# Patient Record
Sex: FEMALE | Race: WHITE | NOT HISPANIC OR LATINO | Employment: UNEMPLOYED | URBAN - METROPOLITAN AREA
[De-identification: names, ages, dates, MRNs, and addresses within clinical notes are randomized per-mention and may not be internally consistent; named-entity substitution may affect disease eponyms.]

---

## 2017-01-16 ENCOUNTER — APPOINTMENT (OUTPATIENT)
Dept: RADIOLOGY | Facility: IMAGING CENTER | Age: 52
End: 2017-01-16
Attending: PHYSICIAN ASSISTANT
Payer: COMMERCIAL

## 2017-01-16 ENCOUNTER — OFFICE VISIT (OUTPATIENT)
Dept: URGENT CARE | Facility: CLINIC | Age: 52
End: 2017-01-16
Payer: COMMERCIAL

## 2017-01-16 VITALS
SYSTOLIC BLOOD PRESSURE: 128 MMHG | RESPIRATION RATE: 16 BRPM | HEART RATE: 72 BPM | DIASTOLIC BLOOD PRESSURE: 80 MMHG | OXYGEN SATURATION: 97 % | TEMPERATURE: 97.9 F

## 2017-01-16 DIAGNOSIS — S80.01XA CONTUSION OF KNEE, RIGHT: ICD-10-CM
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
## 2017-01-16 DIAGNOSIS — S76.011A STRAIN OF FLEXOR MUSCLE OF RIGHT HIP, INITIAL ENCOUNTER: Primary | ICD-10-CM

## 2017-01-16 DIAGNOSIS — M25.551 HIP PAIN, RIGHT: ICD-10-CM

## 2017-01-16 PROCEDURE — 99214 OFFICE O/P EST MOD 30 MIN: CPT | Performed by: PHYSICIAN ASSISTANT

## 2017-01-16 PROCEDURE — 73501 X-RAY EXAM HIP UNI 1 VIEW: CPT | Mod: TC,RT | Performed by: PHYSICIAN ASSISTANT

## 2017-01-16 PROCEDURE — 73501 X-RAY EXAM HIP UNI 1 VIEW: CPT | Mod: 26,RT | Performed by: PHYSICIAN ASSISTANT

## 2017-01-16 RX ORDER — CYCLOBENZAPRINE HCL 10 MG
10 TABLET ORAL 3 TIMES DAILY PRN
Qty: 30 TAB | Refills: 1 | Status: SHIPPED | OUTPATIENT
Start: 2017-01-16 | End: 2017-07-13

## 2017-01-16 ASSESSMENT — ENCOUNTER SYMPTOMS
LOSS OF CONSCIOUSNESS: 0
BOWEL INCONTINENCE: 0
TINGLING: 0
NUMBNESS: 0

## 2017-01-16 NOTE — MR AVS SNAPSHOT
Rosy Garay   2017 3:30 PM   Office Visit   MRN: [id number]    Department:  [facility]   Dept Phone:  [phone]    Description:  Female : 1965   Provider:  Alberta Jones PA-C           Reason for Visit     Fall 5 days ago fall R leg pain since fall,  last night with shooting pain      Allergies as of 2017     Allergen Noted Reactions    Nkda [No Known Drug Allergy] 2008         You were diagnosed with     Strain of flexor muscle of right hip, initial encounter   [4193207]  -  Primary     Contusion of knee, right   [523303]       Fall from slipping on ice, initial encounter   [815914]         Vital Signs     Blood Pressure Pulse Temperature Respirations Oxygen Saturation Smoking Status    128/80 mmHg 72 36.6 °C (97.9 °F) 16 97% Former Smoker      Basic Information     Date Of Birth Sex Race Ethnicity Preferred Language    1965 Female White Non- English      Problem List              ICD-10-CM Priority Class Noted - Resolved    Eczema L30.9   2009 - Present    Asthma J45.909   2009 - Present    Hiatal hernia K44.9   2009 - Present    Menopause Z78.0   2009 - Present    Hypothyroid E03.9   2009 - Present    Personal history of gastric bypass Z98.890   2009 - Present    Hypercholesterolemia E78.00   2009 - Present    Vitamin D deficiency E55.9   2012 - Present    Acute asthma exacerbation J45.901   2013 - Present    Obesity, morbid (more than 100 lbs over ideal weight or BMI > 40) (CMS-HCC) E66.01   2013 - Present    Migraine headache with aura G43.109   10/14/2013 - Present    Low back pain M54.5   2013 - Present    Radicular pain M54.10   2013 - Present    Anxiety F41.9   2013 - Present      Health Maintenance        Date Due Completion Dates    IMM PNEUMOCOCCAL 19-64 (ADULT) MEDIUM RISK SERIES (1 of 1 - PPSV23) 1983 ---    IMM DTaP/Tdap/Td Vaccine (1 - Tdap) 3/4/2012 3/3/2012    COLONOSCOPY 5/13/2014 ---    IMM INFLUENZA (1) 9/1/2016 11/8/2013, 11/3/2009    PAP SMEAR 11/8/2016 11/8/2013, 1/27/2012    MAMMOGRAM 10/25/2017 10/25/2016, 11/22/2013, 8/6/2012, 11/16/2011, 2/22/2010, 2/22/2010, 4/11/2008, 4/11/2008, 6/12/2006            Current Immunizations     Influenza TIV (IM) 11/8/2013  5:35 PM    Influenza Vaccine Pediatric 11/3/2009    TD Vaccine 3/3/2012 10:45 AM      Below and/or attached are the medications your provider expects you to take. Review all of your home medications and newly ordered medications with your provider and/or pharmacist. Follow medication instructions as directed by your provider and/or pharmacist. Please keep your medication list with you and share with your provider. Update the information when medications are discontinued, doses are changed, or new medications (including over-the-counter products) are added; and carry medication information at all times in the event of emergency situations     Allergies:  NKDA - (reactions not documented)               Medications  Valid as of: January 16, 2017 -  4:48 PM    Generic Name Brand Name Tablet Size Instructions for use    Albuterol Sulfate (Nebu Soln) PROVENTIL 2.5mg/3ml 3 mL by Nebulization route every 6 hours as needed for Shortness of Breath.        ALPRAZolam (Tab) XANAX 0.5 MG Take 1 Tab by mouth every day.        Amoxicillin-Pot Clavulanate (Tab) AUGMENTIN 875-125 MG Take 1 Tab by mouth 2 times a day.        Aspirin (Chew Tab) ASA 81 MG Take 81 mg by mouth every day.        Betamethasone Dipropionate (Ointment) DIPROLENE 0.05 % Apply 1 Squirt to affected area(s) 2 times a day as needed (rash).        Cyclobenzaprine HCl (Tab) FLEXERIL 5 MG Take 1-2 Tabs by mouth 3 times a day as needed for Mild Pain.        Cyclobenzaprine HCl (Tab) FLEXERIL 10 MG Take 1 Tab by mouth 3 times a day as needed.        Ergocalciferol (Cap) DRISDOL 00834 UNIT Take 1 Cap by mouth every 7 days.        Fluticasone-Salmeterol (AEROSOL  POWDER, BREATH ACTIVATED) ADVAIR DISKUS 500-50 MCG/DOSE USE 1 INHALATION ORALLY    TWICE DAILY        Hydrocodone-Acetaminophen (Tab) NORCO 5-325 MG Take 1-2 Tabs by mouth every 6 hours as needed.        Hydrocodone-Acetaminophen (Tab) NORCO 5-325 MG Take 1 Tab by mouth every 6 hours as needed.        Levothyroxine Sodium (Tab) SYNTHROID 300 MCG Take 1 Tab by mouth every day.        MethylPREDNISolone (Tab) MEDROL DOSPACK 4 MG Take as directed.        Montelukast Sodium   Take  by mouth.        Olopatadine HCl (Solution) PATANOL 0.1 % Place 1 Drop in both eyes 2 times a day.        Omeprazole (CAPSULE DELAYED RELEASE) PRILOSEC 20 MG Take 1 Cap by mouth every day.        Phentermine HCl (Cap) phentermine 30 MG Take 1 Cap by mouth every morning.        PredniSONE (Tab) DELTASONE 20 MG Take 1 Tab by mouth every day. 3 daily for 5 days, then 2 daily for 5 days then 1 daily for 5 days then off.        Sulfamethoxazole-Trimethoprim (Tab) BACTRIM DS,SEPTRA -160 MG Take 1 Tab by mouth every 12 hours. Stay hydrated        Tobramycin (Solution) TOBREX 0.3 % Place 1 Drop in both eyes every 4 hours.        Topiramate (Tab) TOPAMAX 50 MG Take 2 Tabs by mouth every bedtime. Start with one at bedtime for 10 nights and if not better go to 2 at bedtime        .                 Medicines prescribed today were sent to:     [facility] [id number] [place] [address] [address] [place] [address] AT [facility] [address] [place] [address] [facility] [address]    Phone: [phone] Fax: [phone]    Open 24 Hours?: No    [facility] - [place] - [address] AT PORTAL TO REGISTERED [facility] SITES    [address] [facility] [address]    Phone: [phone] Fax: [phone]    Open 24 Hours?: No      Medication refill instructions:       If your prescription bottle indicates you have medication refills left, it is not necessary to call your provider’s office. Please contact your pharmacy  and they will refill your medication.    If your prescription bottle indicates you do not have any refills left, you may request refills at any time through one of the following ways: The online Job1001 system (except Urgent Care), by calling your provider’s office, or by asking your pharmacy to contact your provider’s office with a refill request. Medication refills are processed only during regular business hours and may not be available until the next business day. Your provider may request additional information or to have a follow-up visit with you prior to refilling your medication.   *Please Note: Medication refills are assigned a new Rx number when refilled electronically. Your pharmacy may indicate that no refills were authorized even though a new prescription for the same medication is available at the pharmacy. Please request the medicine by name with the pharmacy before contacting your provider for a refill.        Your To Do List     Future Labs/Procedures Complete By Expires    DX-HIP-UNILATERAL-WITH PELVIS-1 VIEW RIGHT  As directed 1/16/2018      Instructions    Hip Pain  Your hip is the joint between your upper legs and your lower pelvis. The bones, cartilage, tendons, and muscles of your hip joint perform a lot of work each day supporting your body weight and allowing you to move around.  Hip pain can range from a minor ache to severe pain in one or both of your hips. Pain may be felt on the inside of the hip joint near the groin, or the outside near the buttocks and upper thigh. You may have swelling or stiffness as well.   HOME CARE INSTRUCTIONS   · Take medicines only as directed by your health care provider.  · Apply ice to the injured area:  ¨ Put ice in a plastic bag.  ¨ Place a towel between your skin and the bag.  ¨ Leave the ice on for 15-20 minutes at a time, 3-4 times a day.  · Keep your leg raised (elevated) when possible to lessen swelling.  · Avoid activities that cause pain.  · Follow  specific exercises as directed by your health care provider.  · Sleep with a pillow between your legs on your most comfortable side.  · Record how often you have hip pain, the location of the pain, and what it feels like.  SEEK MEDICAL CARE IF:   · You are unable to put weight on your leg.  · Your hip is red or swollen or very tender to touch.  · Your pain or swelling continues or worsens after 1 week.  · You have increasing difficulty walking.  · You have a fever.  SEEK IMMEDIATE MEDICAL CARE IF:   · You have fallen.  · You have a sudden increase in pain and swelling in your hip.  MAKE SURE YOU:   · Understand these instructions.  · Will watch your condition.  · Will get help right away if you are not doing well or get worse.     This information is not intended to replace advice given to you by your health care provider. Make sure you discuss any questions you have with your health care provider.     Document Released: 06/07/2011 Document Revised: 01/08/2016 Document Reviewed: 08/14/2014  W-locate Interactive Patient Education ©2016 [employer] Inc[employer]         Other Notes About Your Plan     Dr Aaron--bariatric surgeon  Lipid profile 8/09:166/94/44/103.           Kobo Access Code: Activation code not generated  Current Kobo Status: Active

## 2017-01-17 NOTE — PROGRESS NOTES
Subjective:      Rosy Garay is a 51 y.o. female who presents with Fall    PMH:  has a past medical history of Eczema (8/17/2009); Breast calcification seen on mammogram; Menopause (8/17/2009); Allergy; Depression; Hiatal hernia (8/17/2009); Anxiety; ASTHMA; and Hypothyroid (8/17/2009). She also has no past medical history of CAD (coronary artery disease), Seizure disorder (CMS-HCC), Liver disease, Psychiatric disorder, Infectious disease, Congestive heart failure (CMS-HCC), Hypertension, Renal disorder, COPD, Diabetes, Stroke (CMS-HCC), or Cancer (CMS-HCC).  MEDS:   Current outpatient prescriptions:   •  ADVAIR DISKUS 500-50 MCG/DOSE AEPB, USE 1 INHALATION ORALLY    TWICE DAILY, Disp: 1 Each, Rfl: 3  •  albuterol (PROVENTIL) 2.5mg/3ml NEBU solution for nebulization, 3 mL by Nebulization route every 6 hours as needed for Shortness of Breath., Disp: 30 mL, Rfl: 3  •  SYNTHROID 300 MCG TABS, Take 1 Tab by mouth every day. (Patient taking differently: Take 175 mcg by mouth every day.), Disp: 90 Each, Rfl: 0  •  aspirin (ASA) 81 MG Chew Tab chewable tablet, Take 81 mg by mouth every day., Disp: , Rfl:   •  sulfamethoxazole-trimethoprim (BACTRIM DS,SEPTRA DS) 800-160 MG TABS oral tablet, Take 1 Tab by mouth every 12 hours. Stay hydrated, Disp: 20 Tab, Rfl: 0  •  hydrocodone-acetaminophen (NORCO) 5-325 MG TABS per tablet, Take 1 Tab by mouth every 6 hours as needed., Disp: 15 Tab, Rfl: 0  •  methylPREDNISolone (MEDROL DOSPACK) 4 MG TABS, Take as directed., Disp: 1 Tab, Rfl: 0  •  amoxicillin-clavulanate (AUGMENTIN) 875-125 MG TABS, Take 1 Tab by mouth 2 times a day., Disp: 20 Tab, Rfl: 0  •  Montelukast Sodium (SINGULAIR PO), Take  by mouth., Disp: , Rfl:   •  tobramycin (TOBREX) 0.3 % SOLN, Place 1 Drop in both eyes every 4 hours., Disp: 5 mL, Rfl: 0  •  olopatadine (PATANOL) 0.1 % ophthalmic solution, Place 1 Drop in both eyes 2 times a day., Disp: 5 mL, Rfl: 3  •  betamethasone dipropionate (DIPROLENE) 0.05  % OINT, Apply 1 Squirt to affected area(s) 2 times a day as needed (rash)., Disp: 60 g, Rfl: 6  •  cyclobenzaprine (FLEXERIL) 5 MG tablet, Take 1-2 Tabs by mouth 3 times a day as needed for Mild Pain., Disp: 30 Tab, Rfl: 0  •  alprazolam (XANAX) 0.5 MG TABS, Take 1 Tab by mouth every day., Disp: 90 Each, Rfl: 1  •  topiramate (TOPAMAX) 50 MG tablet, Take 2 Tabs by mouth every bedtime. Start with one at bedtime for 10 nights and if not better go to 2 at bedtime, Disp: 60 Tab, Rfl: 11  •  hydrocodone-acetaminophen (NORCO) 5-325 MG TABS per tablet, Take 1-2 Tabs by mouth every 6 hours as needed., Disp: 20 Tab, Rfl: 0  •  phentermine 30 MG capsule, Take 1 Cap by mouth every morning., Disp: 30 Each, Rfl: 1  •  predniSONE (DELTASONE) 20 MG TABS, Take 1 Tab by mouth every day. 3 daily for 5 days, then 2 daily for 5 days then 1 daily for 5 days then off., Disp: 30 Each, Rfl: 0  •  ergocalciferol (DRISDOL) 29285 UNIT capsule, Take 1 Cap by mouth every 7 days., Disp: 12 Cap, Rfl: 3  •  omeprazole (PRILOSEC) 20 MG CPDR, Take 1 Cap by mouth every day., Disp: 90 Cap, Rfl: 3  ALLERGIES:   Allergies   Allergen Reactions   • Nkda [No Known Drug Allergy]      SURGHX:   Past Surgical History   Procedure Laterality Date   • Gastric bypass laparoscopic  2004   • Breast biopsy       microcalcification   • Tubal coagulation laparoscopic bilateral  1990   • Cholecystectomy  1993 or 1994     laparoscopic   • Hernia repair  2005     hiatal   • Other abdominal surgery  2007     revision of gastric pouch   • Breast biopsy       left benign breast biopsy in 1994     SOCHX:  reports that she quit smoking about 11 years ago. Her smoking use included Cigarettes. She quit after 10 years of use. She has never used smokeless tobacco. She reports that she does not drink alcohol or use illicit drugs.  FH: family history includes Cancer in her maternal grandfather and maternal grandmother; Heart Disease in her father, maternal grandmother, mother, and  paternal grandmother; Hyperlipidemia in her father and mother; Lung Disease in her father.          HPI Comments: Patient presents with:  Fall: 5 days ago fall R leg pain since fall,  last night with shooting pain. Pain is worse with flexion/extension going up and down stairs.  Pt states little pain when walking on flat ground, lying down or sitting.          Fall  The accident occurred 3 to 5 days ago. The fall occurred while walking. Distance fallen: ground level fall. She landed on concrete. There was no blood loss. The point of impact was the right knee. The pain is present in the right hip and right upper leg. The pain is at a severity of 4/10. The pain is mild. The symptoms are aggravated by flexion, extension, ambulation and heat. Pertinent negatives include no bowel incontinence, loss of consciousness, numbness or tingling. She has tried acetaminophen, NSAID and rest for the symptoms. The treatment provided mild relief.       Review of Systems   Gastrointestinal: Negative for bowel incontinence.   Neurological: Negative for tingling, loss of consciousness and numbness.   All other systems reviewed and are negative.         Objective:     /80 mmHg  Pulse 72  Temp(Src) 36.6 °C (97.9 °F)  Resp 16  SpO2 97%     Physical Exam   Constitutional: She is oriented to person, place, and time. She appears well-developed and well-nourished. No distress.   HENT:   Head: Normocephalic and atraumatic.   Eyes: Conjunctivae and EOM are normal. Pupils are equal, round, and reactive to light.   Neck: Normal range of motion. Neck supple.   Cardiovascular: Normal rate and intact distal pulses.    Pulmonary/Chest: Effort normal and breath sounds normal.   Musculoskeletal:        Right hip: She exhibits decreased strength (pain with resisted flexion causes loss of strength compared to left hip ). She exhibits normal range of motion (painful but full), no tenderness, no bony tenderness, no swelling and no deformity.         Legs:  Right knee is non tender on exam (this was point of impact of fall).    Neurological: She is alert and oriented to person, place, and time.   Skin: Skin is warm and dry.   Psychiatric: She has a normal mood and affect.   Nursing note and vitals reviewed.         Xray images viewed and read by me, confirmed by radiology:      FINDINGS:  No acute right hip fracture or dislocation identified.  Both hip joint spaces preserved.  No pelvic fracture identified.  SI joints are mildly sclerotic.  Assessment/Plan:     1. Strain of flexor muscle of right hip, initial encounter  DX-HIP-UNILATERAL-WITH PELVIS-1 VIEW RIGHT    cyclobenzaprine (FLEXERIL) 10 MG Tab   2. Contusion of knee, right  DX-HIP-UNILATERAL-WITH PELVIS-1 VIEW RIGHT    cyclobenzaprine (FLEXERIL) 10 MG Tab   3. Fall from slipping on ice, initial encounter  DX-HIP-UNILATERAL-WITH PELVIS-1 VIEW RIGHT    cyclobenzaprine (FLEXERIL) 10 MG Tab     OTC ibuprofen for pain and inflammation.   Rx flexeril while at home, do not drive while taking this medication.     PT should follow up with PCP in 3-5 days for re-evaluation if symptoms have not improved.    Discussed red flags and reasons to return to UC or ED.  Pt and/or family verbalized understanding of diagnosis and follow up instructions and was given informational handout on diagnosis.  PT discharged.

## 2017-02-23 ENCOUNTER — HOSPITAL ENCOUNTER (OUTPATIENT)
Dept: LAB | Facility: MEDICAL CENTER | Age: 52
End: 2017-02-23
Attending: PHYSICIAN ASSISTANT
Payer: COMMERCIAL

## 2017-02-23 LAB
T4 FREE SERPL-MCNC: 0.99 NG/DL (ref 0.53–1.43)
TSH SERPL DL<=0.005 MIU/L-ACNC: 8.49 UIU/ML (ref 0.3–3.7)

## 2017-02-23 PROCEDURE — 86800 THYROGLOBULIN ANTIBODY: CPT

## 2017-02-23 PROCEDURE — 84443 ASSAY THYROID STIM HORMONE: CPT

## 2017-02-23 PROCEDURE — 84439 ASSAY OF FREE THYROXINE: CPT

## 2017-02-23 PROCEDURE — 36415 COLL VENOUS BLD VENIPUNCTURE: CPT

## 2017-02-25 LAB — THYROGLOB AB SERPL-ACNC: 128 IU/ML (ref 0–4)

## 2017-07-13 ENCOUNTER — OFFICE VISIT (OUTPATIENT)
Dept: ENDOCRINOLOGY | Facility: MEDICAL CENTER | Age: 52
End: 2017-07-13
Payer: COMMERCIAL

## 2017-07-13 VITALS
OXYGEN SATURATION: 99 % | HEIGHT: 66 IN | WEIGHT: 232 LBS | BODY MASS INDEX: 37.28 KG/M2 | HEART RATE: 77 BPM | SYSTOLIC BLOOD PRESSURE: 102 MMHG | DIASTOLIC BLOOD PRESSURE: 70 MMHG

## 2017-07-13 DIAGNOSIS — Z78.0 MENOPAUSE: ICD-10-CM

## 2017-07-13 DIAGNOSIS — E03.8 HYPOTHYROIDISM DUE TO HASHIMOTO'S THYROIDITIS: ICD-10-CM

## 2017-07-13 DIAGNOSIS — E66.01 MORBID OBESITY, UNSPECIFIED OBESITY TYPE (HCC): ICD-10-CM

## 2017-07-13 DIAGNOSIS — E06.3 HYPOTHYROIDISM DUE TO HASHIMOTO'S THYROIDITIS: ICD-10-CM

## 2017-07-13 PROCEDURE — 99205 OFFICE O/P NEW HI 60 MIN: CPT | Performed by: INTERNAL MEDICINE

## 2017-07-13 RX ORDER — LEVOTHYROXINE SODIUM 175 UG/1
175 TABLET ORAL
COMMUNITY
End: 2017-07-13

## 2017-07-13 RX ORDER — ESTRADIOL 1 MG/1
1 TABLET ORAL DAILY
Qty: 30 TAB | Refills: 3 | Status: SHIPPED | OUTPATIENT
Start: 2017-07-13 | End: 2017-12-14 | Stop reason: SDUPTHER

## 2017-07-13 RX ORDER — LIOTHYRONINE SODIUM 25 UG/1
25 TABLET ORAL DAILY
Qty: 30 TAB | Refills: 3 | Status: SHIPPED | OUTPATIENT
Start: 2017-07-13 | End: 2017-12-14 | Stop reason: SDUPTHER

## 2017-07-13 RX ORDER — LEVOTHYROXINE SODIUM 0.2 MG/1
200 TABLET ORAL
Qty: 30 TAB | Refills: 3 | Status: SHIPPED | OUTPATIENT
Start: 2017-07-13 | End: 2017-12-14 | Stop reason: SDUPTHER

## 2017-07-13 RX ORDER — EAR PLUGS
8 EACH OTIC (EAR) DAILY
COMMUNITY
End: 2018-01-28

## 2017-07-13 NOTE — MR AVS SNAPSHOT
"        Rosy Garay   2017 7:30 AM   Office Visit   MRN: [id number]    Department:  [facility]inology [facility]   Dept Phone:  [phone]    Description:  Female : 1965   Provider:  Héctor Wang M.D.           Reason for Visit     Hypothyroidism Dx. in .    Fatigue           Allergies as of 2017     Allergen Noted Reactions    Nkda [No Known Drug Allergy] 2008         You were diagnosed with     Hypothyroidism due to Hashimoto's thyroiditis   [7183405]       Menopause   [791151]         Vital Signs     Blood Pressure Pulse Height Weight Body Mass Index Oxygen Saturation    102/70 mmHg 77 1.676 m (5' 6\") 105.235 kg (232 lb) 37.46 kg/m2 99%    Smoking Status                   Former Smoker           Basic Information     Date Of Birth Sex Race Ethnicity Preferred Language    1965 Female White Non- English      Your appointments     Oct 02, 2017  7:40 AM   Established Patient with Héctor Wang M.D.   [facility]    [address] [place] [address], [address]  [facility] [id number] [phone]           You will be receiving a confirmation call a few days before your appointment from our automated call confirmation system.              Problem List              ICD-10-CM Priority Class Noted - Resolved    Eczema L30.9   2009 - Present    Asthma J45.909   2009 - Present    Hiatal hernia K44.9   2009 - Present    Menopause Z78.0   2009 - Present    Hypothyroid E03.9   2009 - Present    Personal history of gastric bypass Z98.890   2009 - Present    Hypercholesterolemia E78.00   2009 - Present    Vitamin D deficiency E55.9   2012 - Present    Acute asthma exacerbation J45.901   2013 - Present    Obesity, morbid (more than 100 lbs over ideal weight or BMI > 40) (CMS-HCC) E66.01   2013 - Present    Migraine headache with aura G43.109   10/14/2013 - Present    Low back pain M54.5   2013 - " Present    Radicular pain M54.10   11/22/2013 - Present    Anxiety F41.9   11/22/2013 - Present      Health Maintenance        Date Due Completion Dates    IMM PNEUMOCOCCAL 19-64 (ADULT) MEDIUM RISK SERIES (1 of 1 - PPSV23) 5/13/1983 ---    IMM DTaP/Tdap/Td Vaccine (1 - Tdap) 3/4/2012 3/3/2012    COLONOSCOPY 5/13/2014 ---    PAP SMEAR 11/8/2016 11/8/2013, 1/27/2012    IMM INFLUENZA (1) 9/1/2017 11/8/2013, 11/3/2009    MAMMOGRAM 10/25/2017 10/25/2016, 11/22/2013, 8/6/2012, 11/16/2011, 2/22/2010, 2/22/2010, 4/11/2008, 4/11/2008, 6/12/2006            Current Immunizations     Influenza TIV (IM) 11/8/2013  5:35 PM    Influenza Vaccine Pediatric 11/3/2009    TD Vaccine 3/3/2012 10:45 AM      Below and/or attached are the medications your provider expects you to take. Review all of your home medications and newly ordered medications with your provider and/or pharmacist. Follow medication instructions as directed by your provider and/or pharmacist. Please keep your medication list with you and share with your provider. Update the information when medications are discontinued, doses are changed, or new medications (including over-the-counter products) are added; and carry medication information at all times in the event of emergency situations     Allergies:  NKDA - (reactions not documented)               Medications  Valid as of: July 13, 2017 -  8:37 AM    Generic Name Brand Name Tablet Size Instructions for use    Albuterol Sulfate (Nebu Soln) PROVENTIL 2.5mg/3ml 3 mL by Nebulization route every 6 hours as needed for Shortness of Breath.        Aspirin (Chew Tab) ASA 81 MG Take 81 mg by mouth every day.        Betamethasone Dipropionate (Ointment) DIPROLENE 0.05 % Apply 1 Squirt to affected area(s) 2 times a day as needed (rash).        Calcium-Vitamin D-Vitamin K (Chew Tab) Calcium-Vitamin D-Vitamin K 500-1000-40 MG-UNT-MCG Take  by mouth.        Cyanocobalamin (Liquid) Vitamin B-12 1000 MCG/15ML Take  by mouth.         Estradiol (Tab) ESTRACE 1 MG Take 1 Tab by mouth every day for 30 days.        Fluticasone-Salmeterol (AEROSOL POWDER, BREATH ACTIVATED) ADVAIR DISKUS 500-50 MCG/DOSE USE 1 INHALATION ORALLY    TWICE DAILY        Levothyroxine Sodium (Tab) SYNTHROID 200 MCG Take 1 Tab by mouth Every morning on an empty stomach.        Liothyronine Sodium (Tab) CYTOMEL 25 MCG Take 1 Tab by mouth every day for 30 days.        non-formulary med non-formulary med  HCG 1 ml oral TID        Omeprazole (CAPSULE DELAYED RELEASE) PRILOSEC 20 MG Take 1 Cap by mouth every day.        Progesterone Micronized (Cap) PROMETRIUM 100 MG Take 1 Cap by mouth every day.        .                 Medicines prescribed today were sent to:     [facility] [address] [place] [address] - [address] [facility] [address] [place] [address] [address]    [address] [facility] [address] [place] [address]    Phone: [phone] Fax: [phone]    Open 24 Hours?: No    [facility] - [place], [place] - [address] AT PORTAL TO REGISTERED [facility] SITES    [address] [facility] [address]    Phone: [phone] Fax: [phone]    Open 24 Hours?: No      Medication refill instructions:       If your prescription bottle indicates you have medication refills left, it is not necessary to call your provider’s office. Please contact your pharmacy and they will refill your medication.    If your prescription bottle indicates you do not have any refills left, you may request refills at any time through one of the following ways: The online Hunch system (except Urgent Care), by calling your provider’s office, or by asking your pharmacy to contact your provider’s office with a refill request. Medication refills are processed only during regular business hours and may not be available until the next business day. Your provider may request additional information or to have a follow-up visit with you prior to refilling your medication.   *Please Note:  Medication refills are assigned a new Rx number when refilled electronically. Your pharmacy may indicate that no refills were authorized even though a new prescription for the same medication is available at the pharmacy. Please request the medicine by name with the pharmacy before contacting your provider for a refill.        Your To Do List     Future Labs/Procedures Complete By Expires    FREE THYROXINE  8/24/2017 7/13/2018    Scheduling Instructions:    To be done after 6 weeks.    TRIIDOTHYRONINE  8/24/2017 7/13/2018    Scheduling Instructions:    To be done after 6 weeks.    Comments:    Free T3    TRIIDOTHYRONINE  8/24/2017 7/13/2018    Scheduling Instructions:    To be done after 6 weeks.    Comments:    Total T3    TSH  8/24/2017 7/13/2018    Scheduling Instructions:    To be done after 6 weeks.      Other Notes About Your Plan     Dr Aaron--bariatric surgeon  Lipid profile 8/09:166/94/44/103.           atVenu Access Code: Activation code not generated  Current atVenu Status: Active

## 2017-07-13 NOTE — PROGRESS NOTES
Rosy states that she is having fatigue, dry skin and weight gain.  She had a Gastric Bypass in 2004 and lost around 70 pounds.  She then started to gain the weight back and was diagnosed with hypothyroidism.  Her latest lab work on 6/5/17 was TSH .11, T3 .89, T4 1.54.  She was on Levothyroixe 250 mcg and was decreased to 175 mcg.  She was started oral HCG 1 ml TID and lost 20 pounds.

## 2017-07-13 NOTE — PROGRESS NOTES
New Patient Consult Note  Primary care physician: No primary care provider on file.    Reason for consult: Hypothyroidism/morbid obesity/menopause    HPI:  Rosy Garay is a 52 y.o. old patient who comes in today for evaluation of hypothyroidism. She is currently on 175 µg of levothyroxine. Her dose was reduced from 250 µg to 175 µg after the TSH results in June 2017 which was 0.11.  She complains of feeling more fatigued and tired throughout the day. He also complains of dry skin.    Obesity: She has a history of gastric bypass at the [facility] following which she lost close to  pounds. States that she then gradually started to gain the weight back and is trying to lose more weight. She is currently on hCG drops over-the-counter one mL 3 times daily and since initiating that she has lost 20 pounds.    ROS:  Constitutional: Fatigue, No weight loss  Cardiac: No palpitations or racing heart  Resp: No shortness of breath  Neuro: No numbness or tinging in feet  Endo: No heat or cold intolerance, no polyuria or polydipsia  Integumentary: Dry skin  All other systems were reviewed and were negative.    Past Medical History:  Patient Active Problem List    Diagnosis Date Noted   • Low back pain 11/22/2013   • Radicular pain 11/22/2013   • Anxiety 11/22/2013   • Migraine headache with aura 10/14/2013   • Acute asthma exacerbation 05/13/2013   • Obesity, morbid (more than 100 lbs over ideal weight or BMI > 40) (CMS-[facility]) 05/13/2013   • Vitamin D deficiency 07/17/2012   • Eczema 08/17/2009   • Asthma 08/17/2009   • Hiatal hernia 08/17/2009   • Menopause 08/17/2009   • Hypothyroid 08/17/2009   • Personal history of gastric bypass 08/17/2009   • Hypercholesterolemia 08/17/2009       Past Surgical History:  Past Surgical History   Procedure Laterality Date   • Gastric bypass laparoscopic  2004   • Breast biopsy       microcalcification   • Tubal coagulation laparoscopic bilateral  1990   • Cholecystectomy  1993 or  1994     laparoscopic   • Hernia repair  2005     hiatal   • Other abdominal surgery  2007     revision of gastric pouch   • Breast biopsy       left benign breast biopsy in 1994       Allergies:  Nkda    Social History:  Social History     Social History   • Marital Status:      Spouse Name: N/A   • Number of Children: N/A   • Years of Education: N/A     Occupational History   • Not on file.     Social History Main Topics   • Smoking status: Former Smoker -- 10 years     Types: Cigarettes     Quit date: 01/02/2006   • Smokeless tobacco: Never Used      Comment: 5 cigarettes a day   • Alcohol Use: No   • Drug Use: No   • Sexual Activity:     Partners: Male     Birth Control/ Protection: Post-Menopausal      Comment: ,      Other Topics Concern   • Exercise Yes   • Bike Helmet No   • Seat Belt Yes   • Self-Exams Yes     Social History Narrative       Family History:  Family History   Problem Relation Age of Onset   • Heart Disease Mother      MI   • Hyperlipidemia Mother    • Heart Disease Father      MI x3, widowmaker   • Lung Disease Father      COPD   • Hyperlipidemia Father    • Heart Disease Maternal Grandmother      MI double bypass   • Cancer Maternal Grandmother      breast   • Cancer Maternal Grandfather      colon and bile duct-diagnosed in 60's   • Heart Disease Paternal Grandmother      MI       Medications:    Current outpatient prescriptions:   •  Cyanocobalamin (VITAMIN B-12) 1000 MCG/15ML Liquid, Take  by mouth., Disp: , Rfl:   •  Calcium-Vitamin D-Vitamin K (CALCIUM + D) 500-1000-40 MG-UNT-MCG Chew Tab, Take  by mouth., Disp: , Rfl:   •  non-formulary med, HCG 1 ml oral TID, Disp: , Rfl:   •  estradiol (ESTRACE) 1 MG Tab, Take 1 Tab by mouth every day for 30 days., Disp: 30 Tab, Rfl: 3  •  progesterone (PROMETRIUM) 100 MG Cap, Take 1 Cap by mouth every day., Disp: 30 Cap, Rfl: 3  •  levothyroxine (SYNTHROID) 200 MCG Tab, Take 1 Tab by mouth Every morning on an empty  "stomach., Disp: 30 Tab, Rfl: 3  •  liothyronine (CYTOMEL) 25 MCG Tab, Take 1 Tab by mouth every day for 30 days., Disp: 30 Tab, Rfl: 3  •  aspirin (ASA) 81 MG Chew Tab chewable tablet, Take 81 mg by mouth every day., Disp: , Rfl:   •  ADVAIR DISKUS 500-50 MCG/DOSE AEPB, USE 1 INHALATION ORALLY    TWICE DAILY, Disp: 1 Each, Rfl: 3  •  betamethasone dipropionate (DIPROLENE) 0.05 % OINT, Apply 1 Squirt to affected area(s) 2 times a day as needed (rash)., Disp: 60 g, Rfl: 6  •  omeprazole (PRILOSEC) 20 MG CPDR, Take 1 Cap by mouth every day., Disp: 90 Cap, Rfl: 3  •  albuterol (PROVENTIL) 2.5mg/3ml NEBU solution for nebulization, 3 mL by Nebulization route every 6 hours as needed for Shortness of Breath., Disp: 30 mL, Rfl: 3    Labs:    Physical Examination:  Vital signs: /70 mmHg  Pulse 77  Ht 1.676 m (5' 6\")  Wt 105.235 kg (232 lb)  BMI 37.46 kg/m2  SpO2 99% Body mass index is 37.46 kg/(m^2).  General: No apparent distress, cooperative, obese  Eyes: No scleral icterus or discharge  ENMT: Normal on external inspection of nose, lips, normal thyroid exam  Neck: No abnormal masses on inspection  Resp: Normal effort, clear to auscultation bilaterally   CVS: Regular rate and rhythm, S1 S2 normal, no murmur   Extremities: No edema  Abdomen: abdominal obesity present  Neuro: Alert and oriented  Skin: No rash  Psych: Normal mood and affect, intact memory and able to make informed decisions    Assessment and Plan:    1. Hypothyroidism due to Hashimoto's thyroiditis  Start   - levothyroxine (SYNTHROID) 200 MCG Tab; Take 1 Tab by mouth Every morning on an empty stomach along with - liothyronine (CYTOMEL) 25 MCG daily.     2. Menopause  Start   - estradiol (ESTRACE) 1 MG Tab; Take 1 Tab by mouth every day along with progesterone (PROMETRIUM) 100 MG  1 Cap every day.      3. Morbid obesity, unspecified obesity type (CMS-HCC)  Seems that the hCG drops are helping her lose weight. To continue and we will monitor " closely.      Return in about 2 months (around 9/13/2017).    Total face to face time spent with patient equals 60 minutes. 35/60 minutes were spent on counseling the patient about the pathophysiology of pituitary-thyroid axis,  pituitary-gonadal axis, symptoms of menopause,  side effects and benefits of thyroid hormone, estrogen and progesterone, different anti-obesity meds and their mechanism of action.    Thank you for allowing me to participate in the care of this patient.    Héctor Wang M.D.  07/13/2017    CC:   No primary care provider on file.    This note was created using voice recognition software (Dragon). The accuracy of the dictation is limited by the abilities of the software. I have reviewed the note prior to signing, however some errors in grammar and context are still possible. If you have any questions related to this note please do not hesitate to contact our office.

## 2017-08-09 ENCOUNTER — HOSPITAL ENCOUNTER (EMERGENCY)
Facility: MEDICAL CENTER | Age: 52
End: 2017-08-09
Attending: EMERGENCY MEDICINE
Payer: COMMERCIAL

## 2017-08-09 ENCOUNTER — APPOINTMENT (OUTPATIENT)
Dept: RADIOLOGY | Facility: MEDICAL CENTER | Age: 52
End: 2017-08-09
Attending: EMERGENCY MEDICINE
Payer: COMMERCIAL

## 2017-08-09 VITALS
HEIGHT: 66 IN | OXYGEN SATURATION: 99 % | DIASTOLIC BLOOD PRESSURE: 69 MMHG | BODY MASS INDEX: 37.95 KG/M2 | HEART RATE: 95 BPM | TEMPERATURE: 98.6 F | WEIGHT: 236.11 LBS | RESPIRATION RATE: 16 BRPM | SYSTOLIC BLOOD PRESSURE: 110 MMHG

## 2017-08-09 DIAGNOSIS — E66.9 OBESITY, UNSPECIFIED OBESITY SEVERITY, UNSPECIFIED OBESITY TYPE: ICD-10-CM

## 2017-08-09 DIAGNOSIS — R11.2 NON-INTRACTABLE VOMITING WITH NAUSEA, UNSPECIFIED VOMITING TYPE: ICD-10-CM

## 2017-08-09 DIAGNOSIS — Z98.84 HISTORY OF GASTRIC BYPASS: ICD-10-CM

## 2017-08-09 DIAGNOSIS — R10.13 EPIGASTRIC PAIN: ICD-10-CM

## 2017-08-09 LAB
ALBUMIN SERPL BCP-MCNC: 4.3 G/DL (ref 3.2–4.9)
ALBUMIN/GLOB SERPL: 1.4 G/DL
ALP SERPL-CCNC: 65 U/L (ref 30–99)
ALT SERPL-CCNC: 30 U/L (ref 2–50)
ANION GAP SERPL CALC-SCNC: 11 MMOL/L (ref 0–11.9)
AST SERPL-CCNC: 29 U/L (ref 12–45)
BASOPHILS # BLD AUTO: 1 % (ref 0–1.8)
BASOPHILS # BLD: 0.08 K/UL (ref 0–0.12)
BILIRUB SERPL-MCNC: 1 MG/DL (ref 0.1–1.5)
BUN SERPL-MCNC: 17 MG/DL (ref 8–22)
CALCIUM SERPL-MCNC: 9.3 MG/DL (ref 8.4–10.2)
CHLORIDE SERPL-SCNC: 105 MMOL/L (ref 96–112)
CO2 SERPL-SCNC: 21 MMOL/L (ref 20–33)
CREAT SERPL-MCNC: 0.66 MG/DL (ref 0.5–1.4)
EKG IMPRESSION: NORMAL
EOSINOPHIL # BLD AUTO: 0.45 K/UL (ref 0–0.51)
EOSINOPHIL NFR BLD: 5.8 % (ref 0–6.9)
ERYTHROCYTE [DISTWIDTH] IN BLOOD BY AUTOMATED COUNT: 37.2 FL (ref 35.9–50)
GFR SERPL CREATININE-BSD FRML MDRD: >60 ML/MIN/1.73 M 2
GLOBULIN SER CALC-MCNC: 3.1 G/DL (ref 1.9–3.5)
GLUCOSE SERPL-MCNC: 88 MG/DL (ref 65–99)
HCT VFR BLD AUTO: 44 % (ref 37–47)
HGB BLD-MCNC: 15.1 G/DL (ref 12–16)
IMM GRANULOCYTES # BLD AUTO: 0.02 K/UL (ref 0–0.11)
IMM GRANULOCYTES NFR BLD AUTO: 0.3 % (ref 0–0.9)
LACTATE BLD-SCNC: 1.29 MMOL/L (ref 0.5–2)
LIPASE SERPL-CCNC: 20 U/L (ref 7–58)
LYMPHOCYTES # BLD AUTO: 2.12 K/UL (ref 1–4.8)
LYMPHOCYTES NFR BLD: 27.2 % (ref 22–41)
MCH RBC QN AUTO: 29.2 PG (ref 27–33)
MCHC RBC AUTO-ENTMCNC: 34.3 G/DL (ref 33.6–35)
MCV RBC AUTO: 84.9 FL (ref 81.4–97.8)
MONOCYTES # BLD AUTO: 0.65 K/UL (ref 0–0.85)
MONOCYTES NFR BLD AUTO: 8.3 % (ref 0–13.4)
NEUTROPHILS # BLD AUTO: 4.47 K/UL (ref 2–7.15)
NEUTROPHILS NFR BLD: 57.4 % (ref 44–72)
NRBC # BLD AUTO: 0 K/UL
NRBC BLD AUTO-RTO: 0 /100 WBC
PLATELET # BLD AUTO: 256 K/UL (ref 164–446)
PMV BLD AUTO: 10.5 FL (ref 9–12.9)
POTASSIUM SERPL-SCNC: 3.8 MMOL/L (ref 3.6–5.5)
PROT SERPL-MCNC: 7.4 G/DL (ref 6–8.2)
RBC # BLD AUTO: 5.18 M/UL (ref 4.2–5.4)
SODIUM SERPL-SCNC: 137 MMOL/L (ref 135–145)
SPECIMEN DRAWN FROM PATIENT: NORMAL
TROPONIN I SERPL-MCNC: <0.02 NG/ML (ref 0–0.04)
WBC # BLD AUTO: 7.8 K/UL (ref 4.8–10.8)

## 2017-08-09 PROCEDURE — 84484 ASSAY OF TROPONIN QUANT: CPT

## 2017-08-09 PROCEDURE — 83605 ASSAY OF LACTIC ACID: CPT

## 2017-08-09 PROCEDURE — A9270 NON-COVERED ITEM OR SERVICE: HCPCS | Performed by: EMERGENCY MEDICINE

## 2017-08-09 PROCEDURE — 96376 TX/PRO/DX INJ SAME DRUG ADON: CPT

## 2017-08-09 PROCEDURE — 74177 CT ABD & PELVIS W/CONTRAST: CPT

## 2017-08-09 PROCEDURE — 96375 TX/PRO/DX INJ NEW DRUG ADDON: CPT

## 2017-08-09 PROCEDURE — 94760 N-INVAS EAR/PLS OXIMETRY 1: CPT

## 2017-08-09 PROCEDURE — 80053 COMPREHEN METABOLIC PANEL: CPT

## 2017-08-09 PROCEDURE — 96361 HYDRATE IV INFUSION ADD-ON: CPT

## 2017-08-09 PROCEDURE — 85025 COMPLETE CBC W/AUTO DIFF WBC: CPT

## 2017-08-09 PROCEDURE — 700111 HCHG RX REV CODE 636 W/ 250 OVERRIDE (IP): Performed by: EMERGENCY MEDICINE

## 2017-08-09 PROCEDURE — 700117 HCHG RX CONTRAST REV CODE 255: Performed by: EMERGENCY MEDICINE

## 2017-08-09 PROCEDURE — 83690 ASSAY OF LIPASE: CPT

## 2017-08-09 PROCEDURE — 700102 HCHG RX REV CODE 250 W/ 637 OVERRIDE(OP): Performed by: EMERGENCY MEDICINE

## 2017-08-09 PROCEDURE — 96374 THER/PROPH/DIAG INJ IV PUSH: CPT

## 2017-08-09 PROCEDURE — 93005 ELECTROCARDIOGRAM TRACING: CPT | Performed by: EMERGENCY MEDICINE

## 2017-08-09 PROCEDURE — 700105 HCHG RX REV CODE 258: Performed by: EMERGENCY MEDICINE

## 2017-08-09 PROCEDURE — 99285 EMERGENCY DEPT VISIT HI MDM: CPT

## 2017-08-09 RX ORDER — IBUPROFEN 200 MG
400 TABLET ORAL EVERY 6 HOURS PRN
Status: SHIPPED | COMMUNITY
End: 2017-09-08

## 2017-08-09 RX ORDER — SODIUM CHLORIDE 9 MG/ML
1000 INJECTION, SOLUTION INTRAVENOUS ONCE
Status: COMPLETED | OUTPATIENT
Start: 2017-08-09 | End: 2017-08-09

## 2017-08-09 RX ORDER — MORPHINE SULFATE 10 MG/ML
6 INJECTION, SOLUTION INTRAMUSCULAR; INTRAVENOUS ONCE
Status: DISCONTINUED | OUTPATIENT
Start: 2017-08-09 | End: 2017-08-09 | Stop reason: HOSPADM

## 2017-08-09 RX ORDER — ACETAMINOPHEN 500 MG
1000 TABLET ORAL EVERY 6 HOURS PRN
COMMUNITY
End: 2018-01-28

## 2017-08-09 RX ORDER — ONDANSETRON 2 MG/ML
4 INJECTION INTRAMUSCULAR; INTRAVENOUS ONCE
Status: COMPLETED | OUTPATIENT
Start: 2017-08-09 | End: 2017-08-09

## 2017-08-09 RX ORDER — MORPHINE SULFATE 4 MG/ML
4 INJECTION, SOLUTION INTRAMUSCULAR; INTRAVENOUS
Status: DISPENSED | OUTPATIENT
Start: 2017-08-09 | End: 2017-08-09

## 2017-08-09 RX ORDER — M-VIT,TX,IRON,MINS/CALC/FOLIC 27MG-0.4MG
1 TABLET ORAL DAILY
COMMUNITY
End: 2018-01-28

## 2017-08-09 RX ORDER — ONDANSETRON 4 MG/1
4 TABLET, ORALLY DISINTEGRATING ORAL EVERY 8 HOURS PRN
Qty: 10 TAB | Refills: 0 | Status: SHIPPED | OUTPATIENT
Start: 2017-08-09 | End: 2018-01-28

## 2017-08-09 RX ADMIN — ONDANSETRON 4 MG: 2 INJECTION INTRAMUSCULAR; INTRAVENOUS at 14:55

## 2017-08-09 RX ADMIN — MORPHINE SULFATE 4 MG: 4 INJECTION INTRAVENOUS at 14:54

## 2017-08-09 RX ADMIN — SODIUM CHLORIDE 1000 ML: 9 INJECTION, SOLUTION INTRAVENOUS at 14:55

## 2017-08-09 RX ADMIN — IOHEXOL 100 ML: 350 INJECTION, SOLUTION INTRAVENOUS at 15:12

## 2017-08-09 RX ADMIN — LIDOCAINE HYDROCHLORIDE 30 ML: 20 SOLUTION OROPHARYNGEAL at 16:15

## 2017-08-09 RX ADMIN — MORPHINE SULFATE 4 MG: 4 INJECTION INTRAVENOUS at 15:37

## 2017-08-09 RX ADMIN — SODIUM CHLORIDE 1000 ML: 900 INJECTION, SOLUTION INTRAVENOUS at 18:00

## 2017-08-09 ASSESSMENT — PAIN SCALES - GENERAL
PAINLEVEL_OUTOF10: 3
PAINLEVEL_OUTOF10: 6
PAINLEVEL_OUTOF10: 6

## 2017-08-09 NOTE — ED PROVIDER NOTES
ED Provider Note    CHIEF COMPLAINT  Chief Complaint   Patient presents with   • Abdominal Pain       HPI  Rosy Garay is a 52 y.o. female who presents with 2 days of constant abdominal pain and one episode of vomiting last night in the setting of prior Baljit-en-Y gastric bypass and hiatal hernia repair.    Location: Predominantly in the epigastrium    Quality: Occasionally sharp and occasionally dull pain, sometimes crampy with occasional radiation to both upper quadrants and towards her back    Severity: At worse symptoms are severe, present they're moderate    Duration: Constant since onset    Timing: Onset 2 days ago without obvious trigger    Context: Not related to by mouth intake, however she has had minimal to take by mouth today    Modifying factors: Elevated [place] eating or drinking, or palpation; denies alleviating factors    Associated signs/symptoms: Positives include nausea and vomiting, pertinent negatives include no fever, cough, chest pain, syncope, dizziness, palpitations, recent illness, weakness, paresthesias, urinary symptoms, diarrhea      REVIEW OF SYSTEMS  See HPI for further details. All other systems are negative.     PAST MEDICAL HISTORY   has a past medical history of Eczema (8/17/2009); Breast calcification seen on mammogram; Menopause (8/17/2009); Allergy; Depression; Hiatal hernia (8/17/2009); Anxiety; ASTHMA; and Hypothyroid (8/17/2009).    PAST FAMILY HISTORY  Family History   Problem Relation Age of Onset   • Heart Disease Mother      MI   • Hyperlipidemia Mother    • Heart Disease Father      MI x3, widowmaker   • Lung Disease Father      COPD   • Hyperlipidemia Father    • Heart Disease Maternal Grandmother      MI double bypass   • Cancer Maternal Grandmother      breast   • Cancer Maternal Grandfather      colon and bile duct-diagnosed in 60's   • Heart Disease Paternal Grandmother      MI       SOCIAL HISTORY  Social History     Social History Main Topics   • Smoking  "status: Former Smoker -- 10 years     Types: Cigarettes     Quit date: 01/02/2006   • Smokeless tobacco: Never Used      Comment: 5 cigarettes a day   • Alcohol Use: No   • Drug Use: No   • Sexual Activity:     Partners: Male     Birth Control/ Protection: Post-Menopausal      Comment: ,        SURGICAL HISTORY   has past surgical history that includes gastric bypass laparoscopic (2004); breast biopsy; tubal coagulation laparoscopic bilateral (1990); cholecystectomy (1993 or 1994); hernia repair (2005); other abdominal surgery (2007); and breast biopsy.    CURRENT MEDICATIONS  Home Medications     Reviewed by Katie Rodriguez (Pharmacy Tech) on 08/09/17 at 1321  Med List Status: Complete    Medication Last Dose Status    acetaminophen (TYLENOL) 500 MG Tab 8/8/2017 Active    ADVAIR DISKUS 500-50 MCG/DOSE AEPB 8/9/2017 Active    aspirin (ASA) 81 MG Chew Tab chewable tablet 8/9/2017 Active    betamethasone dipropionate (DIPROLENE) 0.05 % OINT > 3 days Active    Cyanocobalamin (VITAMIN B-12) 1000 MCG/15ML Liquid 8/9/2017 Active    estradiol (ESTRACE) 1 MG Tab 8/9/2017 Active    ibuprofen (MOTRIN) 200 MG Tab 8/8/2017 Active    levothyroxine (SYNTHROID) 200 MCG Tab 8/9/2017 Active    liothyronine (CYTOMEL) 25 MCG Tab 8/9/2017 Active    non-formulary med 8/9/2017 Active    omeprazole (PRILOSEC) 20 MG CPDR 8/9/2017 Active    progesterone (PROMETRIUM) 100 MG Cap 8/9/2017 Active    therapeutic multivitamin-minerals (THERAGRAN-M) Tab 8/9/2017 Active                ALLERGIES  Allergies   Allergen Reactions   • Nkda [No Known Drug Allergy]        PHYSICAL EXAM  VITAL SIGNS: /88 mmHg  Pulse 110  Temp(Src) 37 °C (98.6 °F)  Resp 50  Ht 1.676 m (5' 6\")  Wt 107.1 kg (236 lb 1.8 oz)  BMI 38.13 kg/m2  SpO2 92% on room air  Pulse ox interpretation: I interpret this pulse ox as normal.  Constitutional: Alert and in no apparent distress.  HENT: No signs of trauma, Bilateral external ears normal, " Nose normal.   Eyes: Pupils are equal and reactive, Conjunctiva normal, Non-icteric.   Neck: Normal range of motion, No tenderness, Supple, No stridor.   Lymphatic: No lymphadenopathy noted.   Cardiovascular: Tachycardic, regular rhythm, no murmurs.   Thorax & Lungs: Normal breath sounds, No respiratory distress, No wheezing, No chest tenderness.   Abdomen: Bowel sounds normal, Soft, mild epigastric tenderness, No masses, No pulsatile masses. No peritoneal signs.  Skin: Warm, Dry, No erythema, No rash.   Back: No bony tenderness, No CVA tenderness.   Extremities: Intact distal pulses, No edema, No tenderness, No cyanosis.  Musculoskeletal: Good range of motion in all major joints. No tenderness to palpation or major deformities noted.   Neurologic: Alert , Normal motor function, Normal sensory function, No focal deficits noted.   Psychiatric: Affect normal, Judgment normal, Mood normal.       DIAGNOSTIC STUDIES / PROCEDURES      LABS & EKG  Results for orders placed or performed during the hospital encounter of 08/09/17   CBC WITH DIFFERENTIAL   Result Value Ref Range    WBC 7.8 4.8 - 10.8 K/uL    RBC 5.18 4.20 - 5.40 M/uL    Hemoglobin 15.1 12.0 - 16.0 g/dL    Hematocrit 44.0 37.0 - 47.0 %    MCV 84.9 81.4 - 97.8 fL    MCH 29.2 27.0 - 33.0 pg    MCHC 34.3 33.6 - 35.0 g/dL    RDW 37.2 35.9 - 50.0 fL    Platelet Count 256 164 - 446 K/uL    MPV 10.5 9.0 - 12.9 fL    Neutrophils-Polys 57.40 44.00 - 72.00 %    Lymphocytes 27.20 22.00 - 41.00 %    Monocytes 8.30 0.00 - 13.40 %    Eosinophils 5.80 0.00 - 6.90 %    Basophils 1.00 0.00 - 1.80 %    Immature Granulocytes 0.30 0.00 - 0.90 %    Nucleated RBC 0.00 /100 WBC    Neutrophils (Absolute) 4.47 2.00 - 7.15 K/uL    Lymphs (Absolute) 2.12 1.00 - 4.80 K/uL    Monos (Absolute) 0.65 0.00 - 0.85 K/uL    Eos (Absolute) 0.45 0.00 - 0.51 K/uL    Baso (Absolute) 0.08 0.00 - 0.12 K/uL    Immature Granulocytes (abs) 0.02 0.00 - 0.11 K/uL    NRBC (Absolute) 0.00 K/uL   COMP  METABOLIC PANEL   Result Value Ref Range    Sodium 137 135 - 145 mmol/L    Potassium 3.8 3.6 - 5.5 mmol/L    Chloride 105 96 - 112 mmol/L    Co2 21 20 - 33 mmol/L    Anion Gap 11.0 0.0 - 11.9    Glucose 88 65 - 99 mg/dL    Bun 17 8 - 22 mg/dL    Creatinine 0.66 0.50 - 1.40 mg/dL    Calcium 9.3 8.4 - 10.2 mg/dL    AST(SGOT) 29 12 - 45 U/L    ALT(SGPT) 30 2 - 50 U/L    Alkaline Phosphatase 65 30 - 99 U/L    Total Bilirubin 1.0 0.1 - 1.5 mg/dL    Albumin 4.3 3.2 - 4.9 g/dL    Total Protein 7.4 6.0 - 8.2 g/dL    Globulin 3.1 1.9 - 3.5 g/dL    A-G Ratio 1.4 g/dL   LIPASE   Result Value Ref Range    Lipase 20 7 - 58 U/L   TROPONIN   Result Value Ref Range    Troponin I <0.02 0.00 - 0.04 ng/mL   LACTIC ACID   Result Value Ref Range    Lactic Acid 1.29 0.50 - 2.00 mmol/L    Specimen Venous    ESTIMATED GFR   Result Value Ref Range    GFR If African American >60 >60 mL/min/1.73 m 2    GFR If Non African American >60 >60 mL/min/1.73 m 2   EKG (NOW)   Result Value Ref Range    Report       [facility] Emergency Dept.    Test Date:  2017  Pt Name:    ORTIZ TRUJILLO            Department: [facility]  MRN:        [id number]                      Room:       -ROOM 4  Gender:     F                            Technician: LINDA  :        [id number]                   Requested By:RANJEET WARNER  Order #:    [id number]                    Reading MD:    Measurements  Intervals                                Axis  Rate:       110                          P:          26  MT:         136                          QRS:        -18  QRSD:       72                           T:          4  QT:         332  QTc:        450    Interpretive Statements  SINUS TACHYCARDIA  LEFT VENTRICULAR HYPERTROPHY  Compared to ECG 2008 05:25:10  Left ventricular hypertrophy now present  Sinus arrhythmia no longer present           RADIOLOGY  CT-ABDOMEN-PELVIS WITH   Final Result      Status post gastric bypass with no  complication of the operative procedure detected      Borderline mesenteric lymphadenopathy. This may just be reactive. Mesenteric adenitis is a consideration. Given the proximity of nodes to the transverse colon, also recommend clinical correlation as to whether the patient is up to date with colon    carcinoma screening.      Moderate distal colonic diverticulosis without evidence of diverticulitis      Status post cholecystectomy        COURSE & MEDICAL DECISION MAKING    This is a 52 y.o. female who presents with abdominal pain in the setting of prior gastric bypass, tachycardic otherwise stable vital signs.    Differential Diagnosis includes but is not limited to:  Internal hernia, hiatal hernia, volvulus, obstruction, hepatobiliary disease, ACS, gastroenteritis, gastritis    ED Course:  Plan CT, labs, EKG, symptom control, fluids.  Labs are reassuring: Troponin negative doubt ACS, lactate normal doubt ischemia or sepsis, lipase normal doubt pancreatitis, electrolytes are within normal limits and no evidence of acidosis, LFTs within normal limits.hepatobiliary disease, CBC normal without evidence of leukocytosis or anemia.  CT scan shows nothing acute, there is some adenopathy noted.  I had a prolonged discussion with the patient and her  regarding the workup and findings, as well as the incidental finding of concern for some lymph nodes in her abdomen that are inflamed. She confirmed with me that she had a normal colonoscopy 3 years ago, and is due for a repeat, she will follow up with her gastroenterologist regarding this as soon as possible. She understands there could be an adenitis versus small but possible chance for malignant process. Given she has no evidence of an acute surgical abdomen and vital signs are improving and her abdomen is soft on serial examinations and she is tolerating by mouth here in the ER without vomiting, I feel that she is stable for discharge home. Her tachycardia is  improving with fluids, we'll continue hydration and discharge when stable. On records reviewed the patient has had multiple visits, mostly related to urinary disease and/or migraines. She has had no recent visits for abdominal pain, however with the extensive workup I feel reassured that all she is at high risk given her history of gastric bypass surgery imaging and labs and vitals show that she is safe for outpatient follow-up and there is no reason for admission or surgery at this time.    Pertinent Labs & Imaging studies reviewed and verified by myself, as well as nursing notes and the patient's past medical, family, and social histories (See chart for details).    Medications   morphine (pf) 4 mg/ml injection 4 mg (4 mg Intravenous Given 8/9/17 1537)   morphine (pf) 10 mg/ml injection 6 mg (not administered)   hyoscyamine-maalox plus-lidocaine viscous (GI COCKTAIL) oral susp 30 mL (not administered)   NS infusion 1,000 mL (not administered)   NS infusion 1,000 mL (1,000 mL Intravenous New Bag 8/9/17 1455)   ondansetron (ZOFRAN) syringe/vial injection 4 mg (4 mg Intravenous Given 8/9/17 1455)   iohexol (OMNIPAQUE) 350 mg/mL (100 mL Intravenous Given 8/9/17 1512)       New Prescriptions    ONDANSETRON (ZOFRAN ODT) 4 MG TABLET DISPERSIBLE    Take 1 Tab by mouth every 8 hours as needed for Nausea/Vomiting.     FINAL IMPRESSION  (R10.13) Epigastric pain  (R11.2) Non-intractable vomiting with nausea, unspecified vomiting type  (Z98.890) History of gastric bypass  (E66.9) Obesity, unspecified obesity severity, unspecified obesity type    Plan:  Discharge home in stable condition after continued hydration     Results, exam findings, clinical impression, presumed diagnosis, treatment options, and strict return precautions were discussed with the patient, and they verbalized understanding, agreed with, and appreciated the plan of care.    Electronically signed by Delroy Patterson on 8/9/2017 at 6:20 PM.

## 2017-08-09 NOTE — ED AVS SNAPSHOT
Soft Tissue Regeneration Access Code: Activation code not generated  Current Soft Tissue Regeneration Status: Active    PLUMgridhart  A secure, online tool to manage your health information     [employer] Soft Tissue Regeneration® is a secure, online tool that connects you to your personalized health information from the privacy of your home -- day or night - making it very easy for you to manage your healthcare. Once the activation process is completed, you can even access your medical information using the Soft Tissue Regeneration malina, which is available for free in the Apple Malina store or Google Play store.     Soft Tissue Regeneration provides the following levels of access (as shown below):   My Chart Features   [facility] Primary Care Doctor [facility]  Specialists [facility]  Urgent  Care Non-[facility]  Primary Care  Doctor   Email your healthcare team securely and privately 24/7 X X X X   Manage appointments: schedule your next appointment; view details of past/upcoming appointments X      Request prescription refills. X      View recent personal medical records, including lab and immunizations X X X X   View health record, including health history, allergies, medications X X X X   Read reports about your outpatient visits, procedures, consult and ER notes X X X X   See your discharge summary, which is a recap of your hospital and/or ER visit that includes your diagnosis, lab results, and care plan. X X       How to register for Soft Tissue Regeneration:  1. Go to  [email or web address].  2. Click on the Sign Up Now box, which takes you to the New Member Sign Up page. You will need to provide the following information:  a. Enter your Soft Tissue Regeneration Access Code exactly as it appears at the top of this page. (You will not need to use this code after you’ve completed the sign-up process. If you do not sign up before the expiration date, you must request a new code.)   b. Enter your date of birth.   c. Enter your home email address.   d. Click Submit, and follow the next screen’s instructions.  3. Create a Soft Tissue Regeneration ID. This will  be your 2can login ID and cannot be changed, so think of one that is secure and easy to remember.  4. Create a 2can password. You can change your password at any time.  5. Enter your Password Reset Question and Answer. This can be used at a later time if you forget your password.   6. Enter your e-mail address. This allows you to receive e-mail notifications when new information is available in 2can.  7. Click Sign Up. You can now view your health information.    For assistance activating your 2can account, call [phone]

## 2017-08-09 NOTE — ED NOTES
Pt states pain improved but still present 6/10. Pt given medication as directed. Pt discouraged by nothing definitive on radiology exams & labs.  Support given.

## 2017-08-09 NOTE — ED NOTES
2 days of mid upper abd pain that radiates across her upper abd. It feels like what she experienced when she had a hernia after her gastric bypass in 2004. Hurts to drink water.

## 2017-08-09 NOTE — ED AVS SNAPSHOT
Home Care Instructions                                                                                                                Rosy Garay   MRN: [id number]    Department:  [facility], Emergency Dept   Date of Visit:  8/9/2017            [facility], Emergency Dept    [address]    [place] [address]    Phone:  [phone]      You were seen by     Delroy Patterson M.D.      Your Diagnosis Was     Epigastric pain     R10.13       These are the medications you received during your hospitalization from 08/09/2017 1216 to 08/09/2017 2028     Date/Time Order Dose Route Action    08/09/2017 1455 NS infusion 1,000 mL 1,000 mL Intravenous New Bag    08/09/2017 1537 morphine (pf) 4 mg/ml injection 4 mg 4 mg Intravenous Given    08/09/2017 1454 morphine (pf) 4 mg/ml injection 4 mg 4 mg Intravenous Given    08/09/2017 1455 ondansetron (ZOFRAN) syringe/vial injection 4 mg 4 mg Intravenous Given    08/09/2017 1512 iohexol (OMNIPAQUE) 350 mg/mL 100 mL Intravenous Given    08/09/2017 1615 hyoscyamine-maalox plus-lidocaine viscous (GI COCKTAIL) oral susp 30 mL 30 mL Oral Given    08/09/2017 1800 NS infusion 1,000 mL 1,000 mL Intravenous New Bag      Follow-up Information     1. Follow up with Scarlet Kramer M.D.. Schedule an appointment as soon as possible for a visit in 2 days.    Specialty:  Internal Medicine    Contact information    [address] [facility] [address] [phone]          2. Follow up with [facility], Emergency Dept Today.    Specialty:  Emergency Medicine    Why:  If symptoms worsen    Contact information    [address] [address] [phone]      Medication Information     Review all of your home medications and newly ordered medications with your primary doctor and/or pharmacist as soon as possible. Follow medication instructions as directed by your doctor and/or  pharmacist.     Please keep your complete medication list with you and share with your physician. Update the information when medications are discontinued, doses are changed, or new medications (including over-the-counter products) are added; and carry medication information at all times in the event of emergency situations.               Medication List      START taking these medications        Instructions    Morning Afternoon Evening Bedtime    ondansetron 4 MG Tbdp   Commonly known as:  ZOFRAN ODT        Take 1 Tab by mouth every 8 hours as needed for Nausea/Vomiting.   Dose:  4 mg                          ASK your doctor about these medications        Instructions    Morning Afternoon Evening Bedtime    acetaminophen 500 MG Tabs   Commonly known as:  TYLENOL        Take 1,000 mg by mouth every 6 hours as needed for Moderate Pain.   Dose:  1000 mg                        ADVAIR DISKUS 500-50 MCG/DOSE Aepb   Generic drug:  fluticasone-salmeterol        USE 1 INHALATION ORALLY    TWICE DAILY                        aspirin 81 MG Chew chewable tablet   Commonly known as:  ASA        Take 81 mg by mouth every day.   Dose:  81 mg                        betamethasone dipropionate 0.05 % Oint   Commonly known as:  DIPROLENE        Apply 1 Squirt to affected area(s) 2 times a day as needed (rash).   Dose:  1 Squirt                        estradiol 1 MG Tabs   Commonly known as:  ESTRACE        Take 1 Tab by mouth every day for 30 days.   Dose:  1 mg                        ibuprofen 200 MG Tabs   Commonly known as:  MOTRIN        Take 400 mg by mouth every 6 hours as needed for Mild Pain.   Dose:  400 mg                        levothyroxine 200 MCG Tabs   Commonly known as:  SYNTHROID        Take 1 Tab by mouth Every morning on an empty stomach.   Dose:  200 mcg                        liothyronine 25 MCG Tabs   Commonly known as:  CYTOMEL        Take 1 Tab by mouth every day for 30 days.   Dose:  25 mcg                           non-formulary med        Take 1 mL by mouth every day. HCG 1 ml oral TID  Indications: For weight lost   Dose:  1 mL                        omeprazole 20 MG delayed-release capsule   Commonly known as:  PRILOSEC        Take 1 Cap by mouth every day.   Dose:  20 mg                        progesterone 100 MG Caps   Commonly known as:  PROMETRIUM        Take 1 Cap by mouth every day.   Dose:  100 mg                        therapeutic multivitamin-minerals Tabs        Take 1 Tab by mouth every day.   Dose:  1 Tab                        Vitamin B-12 1000 MCG/15ML Liqd        Take 8 Drops by mouth every day.   Dose:  8 Drop                             Where to Get Your Medications      These medications were sent to [id number] - [place], [place] - [address]  [address], [place] [address]     Phone:  [phone]    - ondansetron 4 MG Tbdp            Procedures and tests performed during your visit     Procedure/Test Number of Times Performed    CBC WITH DIFFERENTIAL 1    COMP METABOLIC PANEL 1    CONSENT FOR CONTRAST INJECTION 2    CT-ABDOMEN-PELVIS WITH 1    EKG (NOW) 1    ESTIMATED GFR 1    IV Saline Lock 1    LACTIC ACID 1    LIPASE 1    Obtain Old EKG 1    Oxygen 1    Pulse Ox 1    TROPONIN 1        Discharge Instructions       You were seen and evaluated in the Emergency Department at [facility] for:     Abdominal pain, nausea and vomiting.    You had the following tests and studies:    CT scan, blood tests, EKG - thankfully everything looks safe and stable!    You received the following medications:    Morphine, zofran, IV fluids.     You received the following prescriptions:    Zofran for nausea.   You may take maalox as needed.   ----------------------------    Please make sure to follow up with:    Your GI doctor and primary care as soon as possible!  ----------------------------    We always encourage patients to  return IMMEDIATELY if they have:  Increased or changing pain, passing out, fevers over 100.4 (taken in your mouth or rectally) for more than 2 days, redness or swelling of skin or tissues, feeling like your heart is beating fast, chest pain that is new or worsening, trouble breathing, feeling like your throat is closing up and can not breath, inability to walk, weakness of any part of your body, new dizziness, severe bleeding that won't stop from any part of your body, if you can't eat or drink, or if you have any other concerns.   If you feel worse, please know that you can always return with any questions, concerns, worse symptoms, or you are feeling unsafe. We certainly cannot say for sure that we have ruled out every illness or dangerous disease, but we feel that at this specific time, your exam, tests, and vital signs like heart rate and blood pressure are safe for discharge.       Abdominal Pain   Your exam might not show the exact reason you have abdominal pain. Since there are many different causes of abdominal pain, another checkup and more tests may be needed. It is very important to follow up for lasting (persistent) or worsening symptoms. A possible cause of abdominal pain in any person who still has his or her appendix is acute appendicitis. Appendicitis is often hard to diagnose. Normal blood tests, urine tests, ultrasound, and CT scans do not completely rule out early appendicitis or other causes of abdominal pain. Sometimes, only the changes that happen over time will allow appendicitis and other causes of abdominal pain to be determined. Other potential problems that may require surgery may also take time to become more apparent. Because of this, it is important that you follow all of the instructions below.  HOME CARE INSTRUCTIONS   · Rest as much as possible.   · Do not eat solid food until your pain is gone.   · While adults or children have pain: A diet of water, weak decaffeinated tea, broth or  bouillon, gelatin, oral rehydration solutions (ORS), frozen ice pops, or ice chips may be helpful.   · When pain is gone in adults or children: Start a light diet (dry toast, crackers, applesauce, or white rice). Increase the diet slowly as long as it does not bother you. Eat no dairy products (including cheese and eggs) and no spicy, fatty, fried, or high-fiber foods.   · Use no alcohol, caffeine, or cigarettes.   · Take your regular medicines unless your caregiver told you not to.   · Take any prescribed medicine as directed.   · Only take over-the-counter or prescription medicines for pain, discomfort, or fever as directed by your caregiver. Do not give aspirin to children.   If your caregiver has given you a follow-up appointment, it is very important to keep that appointment. Not keeping the appointment could result in a permanent injury and/or lasting (chronic) pain and/or disability. If there is any problem keeping the appointment, you must call to reschedule.   SEEK IMMEDIATE MEDICAL CARE IF:   · Your pain is not gone in 24 hours.   · Your pain becomes worse, changes location, or feels different.   · You or your child has an oral temperature above 102° F (38.9° C), not controlled by medicine.   · Your baby is older than 3 months with a rectal temperature of 102° F (38.9° C) or higher.   · Your baby is 3 months old or younger with a rectal temperature of 100.4° F (38° C) or higher.   · You have shaking chills.   · You keep throwing up (vomiting) or cannot drink liquids.   · There is blood in your vomit or you see blood in your bowel movements.   · Your bowel movements become dark or black.   · You have frequent bowel movements.   · Your bowel movements stop (become blocked) or you cannot pass gas.   · You have bloody, frequent, or painful urination.   · You have yellow discoloration in the skin or whites of the eyes.   · Your stomach becomes bloated or bigger.   · You have dizziness or fainting.   · You have  chest or back pain.   MAKE SURE YOU:   · Understand these instructions.   · Will watch your condition.   · Will get help right away if you are not doing well or get worse.   Document Released: 12/18/2006 Document Revised: 03/11/2013 Document Reviewed: 11/15/2010  ExitCare® Patient Information ©2013 [employer].    Nausea and Vomiting  Nausea is a sick feeling that often comes before throwing up (vomiting). Vomiting is a reflex where stomach contents come out of your mouth. Vomiting can cause severe loss of body fluids (dehydration). Children and elderly adults can become dehydrated quickly, especially if they also have diarrhea. Nausea and vomiting are symptoms of a condition or disease. It is important to find the cause of your symptoms.  CAUSES   · Direct irritation of the stomach lining. This irritation can result from increased acid production (gastroesophageal reflux disease), infection, food poisoning, taking certain medicines (such as nonsteroidal anti-inflammatory drugs), alcohol use, or tobacco use.  · Signals from the brain. These signals could be caused by a headache, heat exposure, an inner ear disturbance, increased pressure in the brain from injury, infection, a tumor, or a concussion, pain, emotional stimulus, or metabolic problems.  · An obstruction in the gastrointestinal tract (bowel obstruction).  · Illnesses such as diabetes, hepatitis, gallbladder problems, appendicitis, kidney problems, cancer, sepsis, atypical symptoms of a heart attack, or eating disorders.  · Medical treatments such as chemotherapy and radiation.  · Receiving medicine that makes you sleep (general anesthetic) during surgery.  DIAGNOSIS  Your caregiver may ask for tests to be done if the problems do not improve after a few days. Tests may also be done if symptoms are severe or if the reason for the nausea and vomiting is not clear. Tests may include:  · Urine tests.  · Blood tests.  · Stool tests.  · Cultures (to look for  evidence of infection).  · X-rays or other imaging studies.  Test results can help your caregiver make decisions about treatment or the need for additional tests.  TREATMENT  You need to stay well hydrated. Drink frequently but in small amounts. You may wish to drink water, sports drinks, clear broth, or eat frozen ice pops or gelatin dessert to help stay hydrated. When you eat, eating slowly may help prevent nausea. There are also some antinausea medicines that may help prevent nausea.  HOME CARE INSTRUCTIONS   · Take all medicine as directed by your caregiver.  · If you do not have an appetite, do not force yourself to eat. However, you must continue to drink fluids.  · If you have an appetite, eat a normal diet unless your caregiver tells you differently.  ¨ Eat a variety of complex carbohydrates (rice, wheat, potatoes, bread), lean meats, yogurt, fruits, and vegetables.  ¨ Avoid high-fat foods because they are more difficult to digest.  · Drink enough water and fluids to keep your urine clear or pale yellow.  · If you are dehydrated, ask your caregiver for specific rehydration instructions. Signs of dehydration may include:  ¨ Severe thirst.  ¨ Dry lips and mouth.  ¨ Dizziness.  ¨ Dark urine.  ¨ Decreasing urine frequency and amount.  ¨ Confusion.  ¨ Rapid breathing or pulse.  SEEK IMMEDIATE MEDICAL CARE IF:   · You have blood or brown flecks (like coffee grounds) in your vomit.  · You have black or bloody stools.  · You have a severe headache or stiff neck.  · You are confused.  · You have severe abdominal pain.  · You have chest pain or trouble breathing.  · You do not urinate at least once every 8 hours.  · You develop cold or clammy skin.  · You continue to vomit for longer than 24 to 48 hours.  · You have a fever.  MAKE SURE YOU:   · Understand these instructions.  · Will watch your condition.  · Will get help right away if you are not doing well or get worse.     This information is not intended to replace  advice given to you by your health care provider. Make sure you discuss any questions you have with your health care provider.     Document Released: 12/18/2006 Document Revised: 03/11/2013 Document Reviewed: 05/16/2012  Elsevier Interactive Patient Education ©2016 [employer]              Patient Information     Patient Information    Following emergency treatment: all patient requiring follow-up care must return either to a private physician or a clinic if your condition worsens before you are able to obtain further medical attention, please return to the emergency room.     Billing Information    At [facility], we work to make the billing process streamlined for our patients.  Our Representatives are here to answer any questions you may have regarding your hospital bill.  If you have insurance coverage and have supplied your insurance information to us, we will submit a claim to your insurer on your behalf.  Should you have any questions regarding your bill, we can be reached online or by phone as follows:  Online: You are able pay your bills online or live chat with our representatives about any billing questions you may have. We are here to help Monday - Friday from 8:00am to 7:30pm and 9:00am - 12:00pm on Saturdays.  Please visit [email or web address]  for more information.   Phone:  [phone] or [phone]    Please note that your emergency physician, surgeon, pathologist, radiologist, anesthesiologist, and other specialists are not employed by [facility] and will therefore bill separately for their services.  Please contact them directly for any questions concerning their bills at the numbers below:     Emergency Physician Services:  [phone]  [facility]:  [phone]  [facility]:  [phone]  [facility]:  [phone]    1. Your final bill may vary from the amount quoted upon discharge if all procedures are  not complete at that time, or if your doctor has additional procedures of which we are not aware. You will receive an additional bill if you return to the Emergency Department at [facility] for suture removal regardless of the facility of which the sutures were placed.     2. Please arrange for settlement of this account at the emergency registration.    3. All self-pay accounts are due in full at the time of treatment.  If you are unable to meet this obligation then payment is expected within 4-5 days.     4. If you have had radiology studies (CT, X-ray, Ultrasound, MRI), you have received a preliminary result during your emergency department visit. Please contact the radiology department [phone] to receive a copy of your final result. Please discuss the Final result with your primary physician or with the follow up physician provided.     Crisis Hotline:  [place] Crisis Hotline:  8-332-QKDXBMM or 1-952.686.7814  [place] Crisis Hotline:    1-354.965.9489 or 027-479-3339         ED Discharge Follow Up Questions    1. In order to provide you with very good care, we would like to follow up with a phone call in the next few days.  May we have your permission to contact you?     YES /  NO    2. What is the best phone number to call you? (       )_____-__________    3. What is the best time to call you?      Morning  /  Afternoon  /  Evening                   Patient Signature:  ____________________________________________________________    Date:  ____________________________________________________________      Your appointments     Oct 02, 2017  7:40 AM   Established Patient with Héctor Wang M.D.   [facility]    [address], [address]  [facility] [id number] [phone]           You will be receiving a confirmation call a few days before your appointment from our automated call confirmation system.

## 2017-08-09 NOTE — ED NOTES
"Pt ambulates to room w/ steady gait. A & O, privacy,plan of care & support given. Gowned, monitored.  at bedside.    Pt presents with 6-7/10 abdominal pain which she indicates along her ribs and radiating into her back. ( pt's description sounds as if pain is along the diaphragmatic barrier.     Pt states pain increases with any PO intake but especially with drinking cold liquids.   Pt adds she has a history of Hashimoto's disease and is followed by an endocrinologist.   Pt has lost 40+lbs since April using the HCG diet. Pt states her physician knows she is doing this diet program.  Pt had gall bladder removed 20 years ago.  Hiatal hernia repair '08 but pt states she feels \"it slides a lot.\"      "

## 2017-08-09 NOTE — ED AVS SNAPSHOT
8/9/2017    Rosy Garay  [address]  [facility] [address]    Dear Rosy:    [facility] wants to ensure your discharge home is safe and you or your loved ones have had all of your questions answered regarding your care after you leave the hospital.    Below is a list of resources and contact information should you have any questions regarding your hospital stay, follow-up instructions, or active medical symptoms.    Questions or Concerns Regarding… Contact   Medical Questions Related to Your Discharge  (7 days a week, 8am-5pm) Contact a Nurse Care Coordinator   [phone]   Medical Questions Not Related to Your Discharge  (24 hours a day / 7 days a week)  Contact the Nurse Health Line   [phone]    Medications or Discharge Instructions Refer to your discharge packet   or contact your [facility] Primary Care Provider   [phone]   Follow-up Appointment(s) Schedule your appointment via [employer]   or contact Scheduling [phone]   Billing Review your statement via [employer]  or contact Billing [phone]   Medical Records Review your records via [employer]   or contact Medical Records [phone]     You may receive a telephone call within two days of discharge. This call is to make certain you understand your discharge instructions and have the opportunity to have any questions answered. You can also easily access your medical information, test results and upcoming appointments via the [employer] free online health management tool. You can learn more and sign up at [email or web address] For assistance setting up your [employer] account, please call [phone].    Once again, we want to ensure your discharge home is safe and that you have a clear understanding of any next steps in your care. If you have any questions or concerns, please do not hesitate to contact us, we are here for you. Thank you for choosing [facility] for your healthcare needs.    Sincerely,    Your [facility] Healthcare Team

## 2017-08-10 NOTE — DISCHARGE INSTRUCTIONS
You were seen and evaluated in the Emergency Department at [facility] for:     Abdominal pain, nausea and vomiting.    You had the following tests and studies:    CT scan, blood tests, EKG - thankfully everything looks safe and stable!    You received the following medications:    Morphine, zofran, IV fluids.     You received the following prescriptions:    Zofran for nausea.   You may take maalox as needed.   ----------------------------    Please make sure to follow up with:    Your GI doctor and primary care as soon as possible!  ----------------------------    We always encourage patients to return IMMEDIATELY if they have:  Increased or changing pain, passing out, fevers over 100.4 (taken in your mouth or rectally) for more than 2 days, redness or swelling of skin or tissues, feeling like your heart is beating fast, chest pain that is new or worsening, trouble breathing, feeling like your throat is closing up and can not breath, inability to walk, weakness of any part of your body, new dizziness, severe bleeding that won't stop from any part of your body, if you can't eat or drink, or if you have any other concerns.   If you feel worse, please know that you can always return with any questions, concerns, worse symptoms, or you are feeling unsafe. We certainly cannot say for sure that we have ruled out every illness or dangerous disease, but we feel that at this specific time, your exam, tests, and vital signs like heart rate and blood pressure are safe for discharge.       Abdominal Pain   Your exam might not show the exact reason you have abdominal pain. Since there are many different causes of abdominal pain, another checkup and more tests may be needed. It is very important to follow up for lasting (persistent) or worsening symptoms. A possible cause of abdominal pain in any person who still has his or her appendix is acute appendicitis. Appendicitis is often hard to diagnose. Normal  blood tests, urine tests, ultrasound, and CT scans do not completely rule out early appendicitis or other causes of abdominal pain. Sometimes, only the changes that happen over time will allow appendicitis and other causes of abdominal pain to be determined. Other potential problems that may require surgery may also take time to become more apparent. Because of this, it is important that you follow all of the instructions below.  HOME CARE INSTRUCTIONS   · Rest as much as possible.   · Do not eat solid food until your pain is gone.   · While adults or children have pain: A diet of water, weak decaffeinated tea, broth or bouillon, gelatin, oral rehydration solutions (ORS), frozen ice pops, or ice chips may be helpful.   · When pain is gone in adults or children: Start a light diet (dry toast, crackers, applesauce, or white rice). Increase the diet slowly as long as it does not bother you. Eat no dairy products (including cheese and eggs) and no spicy, fatty, fried, or high-fiber foods.   · Use no alcohol, caffeine, or cigarettes.   · Take your regular medicines unless your caregiver told you not to.   · Take any prescribed medicine as directed.   · Only take over-the-counter or prescription medicines for pain, discomfort, or fever as directed by your caregiver. Do not give aspirin to children.   If your caregiver has given you a follow-up appointment, it is very important to keep that appointment. Not keeping the appointment could result in a permanent injury and/or lasting (chronic) pain and/or disability. If there is any problem keeping the appointment, you must call to reschedule.   SEEK IMMEDIATE MEDICAL CARE IF:   · Your pain is not gone in 24 hours.   · Your pain becomes worse, changes location, or feels different.   · You or your child has an oral temperature above 102° F (38.9° C), not controlled by medicine.   · Your baby is older than 3 months with a rectal temperature of 102° F (38.9° C) or higher.    · Your baby is 3 months old or younger with a rectal temperature of 100.4° F (38° C) or higher.   · You have shaking chills.   · You keep throwing up (vomiting) or cannot drink liquids.   · There is blood in your vomit or you see blood in your bowel movements.   · Your bowel movements become dark or black.   · You have frequent bowel movements.   · Your bowel movements stop (become blocked) or you cannot pass gas.   · You have bloody, frequent, or painful urination.   · You have yellow discoloration in the skin or whites of the eyes.   · Your stomach becomes bloated or bigger.   · You have dizziness or fainting.   · You have chest or back pain.   MAKE SURE YOU:   · Understand these instructions.   · Will watch your condition.   · Will get help right away if you are not doing well or get worse.   Document Released: 12/18/2006 Document Revised: 03/11/2013 Document Reviewed: 11/15/2010  [employer]® Patient Information ©2013 [employer].    Nausea and Vomiting  Nausea is a sick feeling that often comes before throwing up (vomiting). Vomiting is a reflex where stomach contents come out of your mouth. Vomiting can cause severe loss of body fluids (dehydration). Children and elderly adults can become dehydrated quickly, especially if they also have diarrhea. Nausea and vomiting are symptoms of a condition or disease. It is important to find the cause of your symptoms.  CAUSES   · Direct irritation of the stomach lining. This irritation can result from increased acid production (gastroesophageal reflux disease), infection, food poisoning, taking certain medicines (such as nonsteroidal anti-inflammatory drugs), alcohol use, or tobacco use.  · Signals from the brain. These signals could be caused by a headache, heat exposure, an inner ear disturbance, increased pressure in the brain from injury, infection, a tumor, or a concussion, pain, emotional stimulus, or metabolic problems.  · An obstruction in the gastrointestinal  tract (bowel obstruction).  · Illnesses such as diabetes, hepatitis, gallbladder problems, appendicitis, kidney problems, cancer, sepsis, atypical symptoms of a heart attack, or eating disorders.  · Medical treatments such as chemotherapy and radiation.  · Receiving medicine that makes you sleep (general anesthetic) during surgery.  DIAGNOSIS  Your caregiver may ask for tests to be done if the problems do not improve after a few days. Tests may also be done if symptoms are severe or if the reason for the nausea and vomiting is not clear. Tests may include:  · Urine tests.  · Blood tests.  · Stool tests.  · Cultures (to look for evidence of infection).  · X-rays or other imaging studies.  Test results can help your caregiver make decisions about treatment or the need for additional tests.  TREATMENT  You need to stay well hydrated. Drink frequently but in small amounts. You may wish to drink water, sports drinks, clear broth, or eat frozen ice pops or gelatin dessert to help stay hydrated. When you eat, eating slowly may help prevent nausea. There are also some antinausea medicines that may help prevent nausea.  HOME CARE INSTRUCTIONS   · Take all medicine as directed by your caregiver.  · If you do not have an appetite, do not force yourself to eat. However, you must continue to drink fluids.  · If you have an appetite, eat a normal diet unless your caregiver tells you differently.  ¨ Eat a variety of complex carbohydrates (rice, wheat, potatoes, bread), lean meats, yogurt, fruits, and vegetables.  ¨ Avoid high-fat foods because they are more difficult to digest.  · Drink enough water and fluids to keep your urine clear or pale yellow.  · If you are dehydrated, ask your caregiver for specific rehydration instructions. Signs of dehydration may include:  ¨ Severe thirst.  ¨ Dry lips and mouth.  ¨ Dizziness.  ¨ Dark urine.  ¨ Decreasing urine frequency and amount.  ¨ Confusion.  ¨ Rapid breathing or pulse.  SEEK  IMMEDIATE MEDICAL CARE IF:   · You have blood or brown flecks (like coffee grounds) in your vomit.  · You have black or bloody stools.  · You have a severe headache or stiff neck.  · You are confused.  · You have severe abdominal pain.  · You have chest pain or trouble breathing.  · You do not urinate at least once every 8 hours.  · You develop cold or clammy skin.  · You continue to vomit for longer than 24 to 48 hours.  · You have a fever.  MAKE SURE YOU:   · Understand these instructions.  · Will watch your condition.  · Will get help right away if you are not doing well or get worse.     This information is not intended to replace advice given to you by your health care provider. Make sure you discuss any questions you have with your health care provider.     Document Released: 12/18/2006 Document Revised: 03/11/2013 Document Reviewed: 05/16/2012  [employer] Patient Education ©2016 [employer]

## 2017-08-10 NOTE — ED NOTES
Pt given d/c instructions. Questions answered & support given. IV removed without problem.  to drive pt home. Up steady self. VSS.

## 2017-09-02 ENCOUNTER — HOSPITAL ENCOUNTER (OUTPATIENT)
Dept: LAB | Facility: MEDICAL CENTER | Age: 52
End: 2017-09-02
Attending: INTERNAL MEDICINE
Payer: COMMERCIAL

## 2017-09-02 DIAGNOSIS — E06.3 HYPOTHYROIDISM DUE TO HASHIMOTO'S THYROIDITIS: ICD-10-CM

## 2017-09-02 DIAGNOSIS — E03.8 HYPOTHYROIDISM DUE TO HASHIMOTO'S THYROIDITIS: ICD-10-CM

## 2017-09-02 LAB
T3 SERPL-MCNC: 108.6 NG/DL (ref 60–181)
T4 FREE SERPL-MCNC: 1.23 NG/DL (ref 0.53–1.43)
TSH SERPL DL<=0.005 MIU/L-ACNC: 0.03 UIU/ML (ref 0.3–3.7)

## 2017-09-02 PROCEDURE — 36415 COLL VENOUS BLD VENIPUNCTURE: CPT

## 2017-09-02 PROCEDURE — 84439 ASSAY OF FREE THYROXINE: CPT

## 2017-09-02 PROCEDURE — 84443 ASSAY THYROID STIM HORMONE: CPT

## 2017-09-02 PROCEDURE — 84480 ASSAY TRIIODOTHYRONINE (T3): CPT

## 2017-09-08 ENCOUNTER — OFFICE VISIT (OUTPATIENT)
Dept: ENDOCRINOLOGY | Facility: MEDICAL CENTER | Age: 52
End: 2017-09-08
Payer: COMMERCIAL

## 2017-09-08 VITALS
SYSTOLIC BLOOD PRESSURE: 120 MMHG | DIASTOLIC BLOOD PRESSURE: 80 MMHG | WEIGHT: 233 LBS | BODY MASS INDEX: 37.45 KG/M2 | OXYGEN SATURATION: 98 % | HEART RATE: 96 BPM | HEIGHT: 66 IN

## 2017-09-08 DIAGNOSIS — E55.9 VITAMIN D DEFICIENCY: ICD-10-CM

## 2017-09-08 DIAGNOSIS — E06.3 HYPOTHYROIDISM DUE TO HASHIMOTO'S THYROIDITIS: ICD-10-CM

## 2017-09-08 DIAGNOSIS — E03.8 HYPOTHYROIDISM DUE TO HASHIMOTO'S THYROIDITIS: ICD-10-CM

## 2017-09-08 DIAGNOSIS — Z78.0 MENOPAUSE: ICD-10-CM

## 2017-09-08 PROCEDURE — 99214 OFFICE O/P EST MOD 30 MIN: CPT | Performed by: INTERNAL MEDICINE

## 2017-09-08 RX ORDER — ESTRADIOL 1 MG/1
1 TABLET ORAL DAILY
COMMUNITY
End: 2018-01-28

## 2017-09-08 RX ORDER — SUCRALFATE 1 G/1
1 TABLET ORAL
COMMUNITY
End: 2019-04-01

## 2017-09-08 RX ORDER — LIOTHYRONINE SODIUM 25 UG/1
25 TABLET ORAL DAILY
COMMUNITY
End: 2018-01-28

## 2017-09-10 NOTE — PROGRESS NOTES
Endocrinology Clinic Progress Note  PCP: Scarlet Kramer M.D.    HPI:  Rosy Garay is a 52 y.o. old patient who comes in today for routine follow up.   Hypothyroidism: on 200 mcg of LT4 and 25 mcg of cytomel. Her energy levels have significantly improved. Denies any symptoms of signs of hyperthyroidism.  Menopause: on estradiol supplementation.   ROS:  Constitutional: No unintentional weight loss  Endo: Denies excessive thirst or frequent urination  All other systems were reviewed and were negative.    Past Medical History:  Patient Active Problem List    Diagnosis Date Noted   • Low back pain 11/22/2013   • Radicular pain 11/22/2013   • Anxiety 11/22/2013   • Migraine headache with aura 10/14/2013   • Acute asthma exacerbation 05/13/2013   • Obesity, morbid (more than 100 lbs over ideal weight or BMI > 40) (CMS-[facility]) 05/13/2013   • Vitamin D deficiency 07/17/2012   • Eczema 08/17/2009   • Asthma 08/17/2009   • Hiatal hernia 08/17/2009   • Menopause 08/17/2009   • Hypothyroid 08/17/2009   • Personal history of gastric bypass 08/17/2009   • Hypercholesterolemia 08/17/2009       Medications:    Current Outpatient Prescriptions:   •  sucralfate (CARAFATE) 1 GM Tab, Take 1 g by mouth 4 Times a Day,Before Meals and at Bedtime., Disp: , Rfl:   •  liothyronine (CYTOMEL) 25 MCG Tab, Take 25 mcg by mouth every day., Disp: , Rfl:   •  estradiol (ESTRACE) 1 MG Tab, Take 1 mg by mouth every day., Disp: , Rfl:   •  therapeutic multivitamin-minerals (THERAGRAN-M) Tab, Take 1 Tab by mouth every day., Disp: , Rfl:   •  Cyanocobalamin (VITAMIN B-12) 1000 MCG/15ML Liquid, Take 8 Drops by mouth every day., Disp: , Rfl:   •  non-formulary med, Take 1 mL by mouth every day. HCG 1 ml oral TID  Indications: For weight lost, Disp: , Rfl:   •  progesterone (PROMETRIUM) 100 MG Cap, Take 1 Cap by mouth every day., Disp: 30 Cap, Rfl: 3  •  levothyroxine (SYNTHROID) 200 MCG Tab, Take 1 Tab by mouth Every morning on an empty stomach.,  "Disp: 30 Tab, Rfl: 3  •  aspirin (ASA) 81 MG Chew Tab chewable tablet, Take 81 mg by mouth every day., Disp: , Rfl:   •  ADVAIR DISKUS 500-50 MCG/DOSE AEPB, USE 1 INHALATION ORALLY    TWICE DAILY, Disp: 1 Each, Rfl: 3  •  betamethasone dipropionate (DIPROLENE) 0.05 % OINT, Apply 1 Squirt to affected area(s) 2 times a day as needed (rash)., Disp: 60 g, Rfl: 6  •  omeprazole (PRILOSEC) 20 MG CPDR, Take 1 Cap by mouth every day., Disp: 90 Cap, Rfl: 3  •  acetaminophen (TYLENOL) 500 MG Tab, Take 1,000 mg by mouth every 6 hours as needed for Moderate Pain., Disp: , Rfl:   •  ondansetron (ZOFRAN ODT) 4 MG TABLET DISPERSIBLE, Take 1 Tab by mouth every 8 hours as needed for Nausea/Vomiting., Disp: 10 Tab, Rfl: 0    Labs: Reviewed    Physical Examination:  Vital signs: /80   Pulse 96   Ht 1.676 m (5' 6\")   Wt 105.7 kg (233 lb)   SpO2 98%   BMI 37.61 kg/m²  Body mass index is 37.61 kg/m².  General: No apparent distress, cooperative  Eyes: No scleral icterus, no discharge, normal eyelids  Neck: No abnormal masses on inspection, normal thyroid exam  Resp: Normal effort, clear to auscultation bilaterally  CVS: Regular rate and rhythm, S1 S2 normal, no murmur  Extremities: No lower extremity edema  Abdomen: abdominal obesity present  Musculoskeletal: Normal digits and nails  Skin: No rash on visible skin  Psych: Alert and oriented, normal mood and affect, intact memory and able to make informed decisions.    Assessment and Plan:    1. Menopause  Doing well on estradiol.     2. Hypothyroidism due to Hashimoto's thyroiditis  Feels well on current regimen. Biochemically hyperthyroid; clinically euthyroid.    3. Vitamin D deficiency  On supplementation with vit D. Will check levels to ensure that they are up now when compared to before.       Return in about 2 months (around 11/8/2017).    Thank you for allowing me to participate in the care of this patient.    Héctor Wang M.D.    CC:   Scarlet Kramer M.D.    This " note was created using voice recognition software (Dragon). The accuracy of the dictation is limited by the abilities of the software. I have reviewed the note prior to signing, however some errors in grammar and context are still possible. If you have any questions related to this note please do not hesitate to contact our office.

## 2017-12-04 ENCOUNTER — HOSPITAL ENCOUNTER (OUTPATIENT)
Dept: LAB | Facility: MEDICAL CENTER | Age: 52
End: 2017-12-04
Attending: INTERNAL MEDICINE
Payer: COMMERCIAL

## 2017-12-04 DIAGNOSIS — E55.9 VITAMIN D DEFICIENCY: ICD-10-CM

## 2017-12-04 LAB
25(OH)D3 SERPL-MCNC: 41 NG/ML (ref 30–100)
T4 FREE SERPL-MCNC: 1.65 NG/DL (ref 0.53–1.43)
TSH SERPL DL<=0.005 MIU/L-ACNC: 0.08 UIU/ML (ref 0.3–3.7)

## 2017-12-04 PROCEDURE — 82306 VITAMIN D 25 HYDROXY: CPT

## 2017-12-04 PROCEDURE — 84439 ASSAY OF FREE THYROXINE: CPT

## 2017-12-04 PROCEDURE — 36415 COLL VENOUS BLD VENIPUNCTURE: CPT

## 2017-12-04 PROCEDURE — 84443 ASSAY THYROID STIM HORMONE: CPT

## 2017-12-12 ENCOUNTER — OFFICE VISIT (OUTPATIENT)
Dept: ENDOCRINOLOGY | Facility: MEDICAL CENTER | Age: 52
End: 2017-12-12
Payer: COMMERCIAL

## 2017-12-12 VITALS
HEIGHT: 66 IN | SYSTOLIC BLOOD PRESSURE: 134 MMHG | BODY MASS INDEX: 36.16 KG/M2 | RESPIRATION RATE: 12 BRPM | WEIGHT: 225 LBS | DIASTOLIC BLOOD PRESSURE: 76 MMHG | HEART RATE: 96 BPM | OXYGEN SATURATION: 98 %

## 2017-12-12 DIAGNOSIS — E03.8 HYPOTHYROIDISM DUE TO HASHIMOTO'S THYROIDITIS: ICD-10-CM

## 2017-12-12 DIAGNOSIS — E06.3 HYPOTHYROIDISM DUE TO HASHIMOTO'S THYROIDITIS: ICD-10-CM

## 2017-12-12 DIAGNOSIS — E55.9 VITAMIN D DEFICIENCY: ICD-10-CM

## 2017-12-12 DIAGNOSIS — Z78.0 MENOPAUSE: ICD-10-CM

## 2017-12-12 PROCEDURE — 99214 OFFICE O/P EST MOD 30 MIN: CPT | Performed by: INTERNAL MEDICINE

## 2017-12-13 NOTE — PROGRESS NOTES
Endocrinology Clinic Progress Note  PCP: Scarlet Kramer M.D.    HPI:  Rosy Garay is a 52 y.o. old patient who comes in today for routine follow up.   Hypothyroidism: Currently she is on 200 µg of levothyroxine daily along with cytomel 25 mcg daily on 6 days of the week. She feels fine and denies any complaints consistent with either hypo-or hyperthyroidism.   vitamin D deficiency: She is on 4000 units daily as the maintenance dose.  Menopause: She is on estradiol. Doing well.    ROS:  Constitutional: No unintentional weight loss  Endo: Denies excessive thirst or frequent urination  All other systems were reviewed and were negative.    Past Medical History:  Patient Active Problem List    Diagnosis Date Noted   • Low back pain 11/22/2013   • Radicular pain 11/22/2013   • Anxiety 11/22/2013   • Migraine headache with aura 10/14/2013   • Acute asthma exacerbation 05/13/2013   • Obesity, morbid (more than 100 lbs over ideal weight or BMI > 40) (CMS-[facility]) 05/13/2013   • Vitamin D deficiency 07/17/2012   • Eczema 08/17/2009   • Asthma 08/17/2009   • Hiatal hernia 08/17/2009   • Menopause 08/17/2009   • Hypothyroid 08/17/2009   • Personal history of gastric bypass 08/17/2009   • Hypercholesterolemia 08/17/2009       Medications:    Current Outpatient Prescriptions:   •  sucralfate (CARAFATE) 1 GM Tab, Take 1 g by mouth 4 Times a Day,Before Meals and at Bedtime., Disp: , Rfl:   •  liothyronine (CYTOMEL) 25 MCG Tab, Take 25 mcg by mouth every day., Disp: , Rfl:   •  estradiol (ESTRACE) 1 MG Tab, Take 1 mg by mouth every day., Disp: , Rfl:   •  therapeutic multivitamin-minerals (THERAGRAN-M) Tab, Take 1 Tab by mouth every day., Disp: , Rfl:   •  Cyanocobalamin (VITAMIN B-12) 1000 MCG/15ML Liquid, Take 8 Drops by mouth every day., Disp: , Rfl:   •  non-formulary med, Take 1 mL by mouth every day. HCG 1 ml oral TID  Indications: For weight lost, Disp: , Rfl:   •  progesterone (PROMETRIUM) 100 MG Cap, Take 1 Cap by  "mouth every day., Disp: 30 Cap, Rfl: 3  •  levothyroxine (SYNTHROID) 200 MCG Tab, Take 1 Tab by mouth Every morning on an empty stomach., Disp: 30 Tab, Rfl: 3  •  aspirin (ASA) 81 MG Chew Tab chewable tablet, Take 81 mg by mouth every day., Disp: , Rfl:   •  ADVAIR DISKUS 500-50 MCG/DOSE AEPB, USE 1 INHALATION ORALLY    TWICE DAILY, Disp: 1 Each, Rfl: 3  •  betamethasone dipropionate (DIPROLENE) 0.05 % OINT, Apply 1 Squirt to affected area(s) 2 times a day as needed (rash)., Disp: 60 g, Rfl: 6  •  omeprazole (PRILOSEC) 20 MG CPDR, Take 1 Cap by mouth every day., Disp: 90 Cap, Rfl: 3  •  acetaminophen (TYLENOL) 500 MG Tab, Take 1,000 mg by mouth every 6 hours as needed for Moderate Pain., Disp: , Rfl:   •  ondansetron (ZOFRAN ODT) 4 MG TABLET DISPERSIBLE, Take 1 Tab by mouth every 8 hours as needed for Nausea/Vomiting. (Patient not taking: Reported on 12/12/2017), Disp: 10 Tab, Rfl: 0    Labs: Reviewed    Physical Examination:  Vital signs: /76   Pulse 96   Resp 12   Ht 1.676 m (5' 6\")   Wt 102.1 kg (225 lb)   SpO2 98%   BMI 36.32 kg/m²  Body mass index is 36.32 kg/m².  General: No apparent distress, cooperative  Eyes: No scleral icterus, no discharge, normal eyelids  Neck: No abnormal masses on inspection, normal thyroid exam  Resp: Normal effort, clear to auscultation bilaterally  CVS: Regular rate and rhythm, S1 S2 normal, no murmur  Extremities: No lower extremity edema  Abdomen: abdominal obesity present  Musculoskeletal: Normal digits and nails  Skin: No rash on visible skin  Psych: Alert and oriented, normal mood and affect, intact memory and able to make informed decisions.    Assessment and Plan:    1. Menopause  Continue estradiol.  2. Hypothyroidism due to Hashimoto's thyroiditis  Continued dosages as per history of present illness. Repeat thyroid panel before next visit.    3. Vitamin D deficiency  Her vitamin D levels have, from 27 t 41. Advised to take 8000 units daily as a maintenance " dose.      Return in about 3 months (around 3/12/2018).    Thank you for allowing me to participate in the care of this patient.    Héctor Wang M.D.    CC:   Scarlet Kramer M.D.    This note was created using voice recognition software (Dragon). The accuracy of the dictation is limited by the abilities of the software. I have reviewed the note prior to signing, however some errors in grammar and context are still possible. If you have any questions related to this note please do not hesitate to contact our office.

## 2017-12-14 DIAGNOSIS — E06.3 HYPOTHYROIDISM DUE TO HASHIMOTO'S THYROIDITIS: ICD-10-CM

## 2017-12-14 DIAGNOSIS — Z78.0 MENOPAUSE: ICD-10-CM

## 2017-12-14 DIAGNOSIS — E03.8 HYPOTHYROIDISM DUE TO HASHIMOTO'S THYROIDITIS: ICD-10-CM

## 2017-12-14 RX ORDER — ESTRADIOL 1 MG/1
TABLET ORAL
Qty: 30 TAB | Refills: 0 | Status: SHIPPED | OUTPATIENT
Start: 2017-12-14 | End: 2018-04-03 | Stop reason: SDUPTHER

## 2017-12-14 RX ORDER — LIOTHYRONINE SODIUM 25 UG/1
TABLET ORAL
Qty: 30 TAB | Refills: 0 | Status: SHIPPED | OUTPATIENT
Start: 2017-12-14 | End: 2018-04-03 | Stop reason: SDUPTHER

## 2017-12-14 RX ORDER — LEVOTHYROXINE SODIUM 0.2 MG/1
TABLET ORAL
Qty: 30 TAB | Refills: 0 | Status: SHIPPED | OUTPATIENT
Start: 2017-12-14 | End: 2018-04-03 | Stop reason: SDUPTHER

## 2018-01-28 ENCOUNTER — APPOINTMENT (OUTPATIENT)
Dept: RADIOLOGY | Facility: IMAGING CENTER | Age: 53
End: 2018-01-28
Attending: PHYSICIAN ASSISTANT
Payer: COMMERCIAL

## 2018-01-28 ENCOUNTER — OFFICE VISIT (OUTPATIENT)
Dept: URGENT CARE | Facility: PHYSICIAN GROUP | Age: 53
End: 2018-01-28
Payer: COMMERCIAL

## 2018-01-28 VITALS
BODY MASS INDEX: 35.62 KG/M2 | TEMPERATURE: 98.3 F | WEIGHT: 221.6 LBS | RESPIRATION RATE: 18 BRPM | OXYGEN SATURATION: 97 % | SYSTOLIC BLOOD PRESSURE: 128 MMHG | HEART RATE: 86 BPM | HEIGHT: 66 IN | DIASTOLIC BLOOD PRESSURE: 78 MMHG

## 2018-01-28 DIAGNOSIS — K21.9 GASTROESOPHAGEAL REFLUX DISEASE, ESOPHAGITIS PRESENCE NOT SPECIFIED: ICD-10-CM

## 2018-01-28 DIAGNOSIS — R06.02 SOB (SHORTNESS OF BREATH): ICD-10-CM

## 2018-01-28 DIAGNOSIS — J22 LOWER RESP. TRACT INFECTION: ICD-10-CM

## 2018-01-28 PROCEDURE — 99214 OFFICE O/P EST MOD 30 MIN: CPT | Performed by: PHYSICIAN ASSISTANT

## 2018-01-28 PROCEDURE — 71046 X-RAY EXAM CHEST 2 VIEWS: CPT | Mod: TC,FY | Performed by: PHYSICIAN ASSISTANT

## 2018-01-28 RX ORDER — OMEPRAZOLE 20 MG/1
20 CAPSULE, DELAYED RELEASE ORAL DAILY
Qty: 90 CAP | Refills: 0 | Status: SHIPPED | OUTPATIENT
Start: 2018-01-28 | End: 2018-05-04

## 2018-01-28 NOTE — PROGRESS NOTES
Chief Complaint   Patient presents with   • Gastrophageal Reflux   • Other     burning in her chest   • Cough       HISTORY OF PRESENT ILLNESS: Patient is a 52 y.o. female who presents today for the following:    GERD - chronic  Cough with yellow/green sputum this morning  Feels that she aspirated 2 nights ago  H/o aspiration PNA from GERD  Out of omeprazole x 6 days (when GERD worsened)  SOB slightly worse  On sucralfate also  Continues nebulizers at home  Denies overt fever but does report sweats    Patient Active Problem List    Diagnosis Date Noted   • Low back pain 11/22/2013   • Radicular pain 11/22/2013   • Anxiety 11/22/2013   • Migraine headache with aura 10/14/2013   • Acute asthma exacerbation 05/13/2013   • Obesity, morbid (more than 100 lbs over ideal weight or BMI > 40) (CMS-[facility]) 05/13/2013   • Vitamin D deficiency 07/17/2012   • Eczema 08/17/2009   • Asthma 08/17/2009   • Hiatal hernia 08/17/2009   • Menopause 08/17/2009   • Hypothyroid 08/17/2009   • Personal history of gastric bypass 08/17/2009   • Hypercholesterolemia 08/17/2009       Allergies:Nkda [no known drug allergy]    Current Outpatient Prescriptions Ordered in [facility]   Medication Sig Dispense Refill   • omeprazole (PRILOSEC) 20 MG delayed-release capsule Take 1 Cap by mouth every day. 90 Cap 0   • progesterone (PROMETRIUM) 100 MG Cap TAKE 1 CAPSULE BY MOUTH EVERY DAY 30 Cap 0   • estradiol (ESTRACE) 1 MG Tab TAKE 1 TABLET BY MOUTH EVERY DAY 30 Tab 0   • liothyronine (CYTOMEL) 25 MCG Tab TAKE 1 TABLET BY MOUTH EVERY DAY 30 Tab 0   • levothyroxine (SYNTHROID) 200 MCG Tab TAKE 1 TABLET BY MOUTH EVERY MORNING ON AN EMPTY STOMACH 30 Tab 0   • sucralfate (CARAFATE) 1 GM Tab Take 1 g by mouth 4 Times a Day,Before Meals and at Bedtime.     • ADVAIR DISKUS 500-50 MCG/DOSE AEPB USE 1 INHALATION ORALLY    TWICE DAILY 1 Each 3     No current Epic-ordered facility-administered medications on file.        Past Medical History:   Diagnosis Date   • Allergy  "   • Anxiety    • ASTHMA     worsening and been in hospital   • Breast calcification seen on mammogram     left breast   • Depression     seasonal-untreated   • Eczema 2009   • Hiatal hernia 2009   • Hypothyroid 2009   • Menopause 2009   • Ulcer (CMS-HCC)     gastric ulcer on gastric bypass in incision       Social History   Substance Use Topics   • Smoking status: Former Smoker     Years: 10.00     Types: Cigarettes     Quit date: 2006   • Smokeless tobacco: Never Used      Comment: 5 cigarettes a day   • Alcohol use No       Family Status   Relation Status   • Mother Alive   • Father Alive    comes from family of 10 kids.  All have had MI's   • Maternal Grandmother  at age 94    head injury   • Maternal Grandfather    • Paternal Grandmother  at age 70    MI   • Paternal Grandfather     health history unknown     Family History   Problem Relation Age of Onset   • Heart Disease Mother      MI   • Hyperlipidemia Mother    • Heart Disease Father      MI x3, widowmaker   • Lung Disease Father      COPD   • Hyperlipidemia Father    • Heart Disease Maternal Grandmother      MI double bypass   • Cancer Maternal Grandmother      breast   • Cancer Maternal Grandfather      colon and bile duct-diagnosed in 60's   • Heart Disease Paternal Grandmother      MI       ROS:   Review of Systems   Constitutional: Negative for weight loss and malaise/fatigue.   HENT: Negative for ear pain, nosebleeds, congestion, sore throat and neck pain.    Eyes: Negative for blurred vision.   Respiratory: Negative for  wheezing.    Cardiovascular: Negative for chest pain, palpitations, orthopnea and leg swelling.   Gastrointestinal: Negative for heartburn, nausea, vomiting and abdominal pain.   Genitourinary: Negative for dysuria, urgency and frequency.       Exam:  Blood pressure 128/78, pulse 86, temperature 36.8 °C (98.3 °F), resp. rate 18, height 1.676 m (5' 6\"), weight 100.5 kg (221 lb " 9.6 oz), SpO2 97 %.  General: Well developed, well nourished. No distress.  HEENT: Conjunctiva clear, lids without ptosis, PERRL/EOMI. Ears normal shape and contour, canals are clear bilaterally, tympanic membranes are benign. Nasal mucosa benign. Oropharynx is without erythema, edema or exudates. Moist mucous membranes.  Pulmonary: Clear to ausculation and percussion.  Normal effort. No rales, ronchi, or wheezing.   Cardiovascular: Regular rate and rhythm without murmur. No edema.   Neurologic: Grossly nonfocal.  Lymph: No cervical lymphadenopathy noted.  Skin: Warm, dry, good turgor. No rashes in visible areas.   Psych: Normal mood. Alert and oriented x3. Judgment and insight is normal.    Chest x-ray, per radiology:  Impression       No active disease.       Assessment/Plan:  Follow-up with primary care provider next week as scheduled. Discussed appropriate over-the-counter symptomatic medication, and when to return to clinic. Return to clinic for worsening or persistent symptoms.  1. Lower resp. tract infection  DX-CHEST-2 VIEWS   2. SOB (shortness of breath)  DX-CHEST-2 VIEWS   3. Gastroesophageal reflux disease, esophagitis presence not specified  omeprazole (PRILOSEC) 20 MG delayed-release capsule

## 2018-03-27 ENCOUNTER — HOSPITAL ENCOUNTER (OUTPATIENT)
Dept: LAB | Facility: MEDICAL CENTER | Age: 53
End: 2018-03-27
Attending: INTERNAL MEDICINE
Payer: COMMERCIAL

## 2018-03-27 DIAGNOSIS — E55.9 VITAMIN D DEFICIENCY: ICD-10-CM

## 2018-03-27 DIAGNOSIS — E03.8 HYPOTHYROIDISM DUE TO HASHIMOTO'S THYROIDITIS: ICD-10-CM

## 2018-03-27 DIAGNOSIS — E06.3 HYPOTHYROIDISM DUE TO HASHIMOTO'S THYROIDITIS: ICD-10-CM

## 2018-03-27 LAB
25(OH)D3 SERPL-MCNC: 75 NG/ML (ref 30–100)
ALBUMIN SERPL BCP-MCNC: 4 G/DL (ref 3.2–4.9)
ALBUMIN/GLOB SERPL: 1.5 G/DL
ALP SERPL-CCNC: 47 U/L (ref 30–99)
ALT SERPL-CCNC: 17 U/L (ref 2–50)
ANION GAP SERPL CALC-SCNC: 5 MMOL/L (ref 0–11.9)
AST SERPL-CCNC: 19 U/L (ref 12–45)
BASOPHILS # BLD AUTO: 2.1 % (ref 0–1.8)
BASOPHILS # BLD: 0.18 K/UL (ref 0–0.12)
BILIRUB SERPL-MCNC: 0.4 MG/DL (ref 0.1–1.5)
BUN SERPL-MCNC: 12 MG/DL (ref 8–22)
CALCIUM SERPL-MCNC: 9.4 MG/DL (ref 8.5–10.5)
CHLORIDE SERPL-SCNC: 109 MMOL/L (ref 96–112)
CHOLEST SERPL-MCNC: 149 MG/DL (ref 100–199)
CO2 SERPL-SCNC: 28 MMOL/L (ref 20–33)
COMMENT 1642: NORMAL
CREAT SERPL-MCNC: 0.85 MG/DL (ref 0.5–1.4)
EOSINOPHIL # BLD AUTO: 2.35 K/UL (ref 0–0.51)
EOSINOPHIL NFR BLD: 27.1 % (ref 0–6.9)
ERYTHROCYTE [DISTWIDTH] IN BLOOD BY AUTOMATED COUNT: 42.4 FL (ref 35.9–50)
EST. AVERAGE GLUCOSE BLD GHB EST-MCNC: 97 MG/DL
GLOBULIN SER CALC-MCNC: 2.7 G/DL (ref 1.9–3.5)
GLUCOSE SERPL-MCNC: 88 MG/DL (ref 65–99)
HBA1C MFR BLD: 5 % (ref 0–5.6)
HCT VFR BLD AUTO: 46.5 % (ref 37–47)
HDLC SERPL-MCNC: 39 MG/DL
HGB BLD-MCNC: 15.4 G/DL (ref 12–16)
IMM GRANULOCYTES # BLD AUTO: 0.02 K/UL (ref 0–0.11)
IMM GRANULOCYTES NFR BLD AUTO: 0.2 % (ref 0–0.9)
LDLC SERPL CALC-MCNC: 94 MG/DL
LYMPHOCYTES # BLD AUTO: 3.44 K/UL (ref 1–4.8)
LYMPHOCYTES NFR BLD: 39.7 % (ref 22–41)
MCH RBC QN AUTO: 29.7 PG (ref 27–33)
MCHC RBC AUTO-ENTMCNC: 33.1 G/DL (ref 33.6–35)
MCV RBC AUTO: 89.8 FL (ref 81.4–97.8)
MONOCYTES # BLD AUTO: 0.48 K/UL (ref 0–0.85)
MONOCYTES NFR BLD AUTO: 5.5 % (ref 0–13.4)
MORPHOLOGY BLD-IMP: NORMAL
NEUTROPHILS # BLD AUTO: 2.19 K/UL (ref 2–7.15)
NEUTROPHILS NFR BLD: 25.4 % (ref 44–72)
NRBC # BLD AUTO: 0 K/UL
NRBC BLD-RTO: 0 /100 WBC
PLATELET # BLD AUTO: 254 K/UL (ref 164–446)
PMV BLD AUTO: 11.8 FL (ref 9–12.9)
POTASSIUM SERPL-SCNC: 4 MMOL/L (ref 3.6–5.5)
PROT SERPL-MCNC: 6.7 G/DL (ref 6–8.2)
RBC # BLD AUTO: 5.18 M/UL (ref 4.2–5.4)
SODIUM SERPL-SCNC: 142 MMOL/L (ref 135–145)
T3 SERPL-MCNC: 82.2 NG/DL (ref 60–181)
T4 FREE SERPL-MCNC: 1.34 NG/DL (ref 0.53–1.43)
TRIGL SERPL-MCNC: 81 MG/DL (ref 0–149)
TSH SERPL DL<=0.005 MIU/L-ACNC: 0.16 UIU/ML (ref 0.38–5.33)
WBC # BLD AUTO: 8.7 K/UL (ref 4.8–10.8)

## 2018-03-27 PROCEDURE — 84443 ASSAY THYROID STIM HORMONE: CPT

## 2018-03-27 PROCEDURE — 84439 ASSAY OF FREE THYROXINE: CPT

## 2018-03-27 PROCEDURE — 84480 ASSAY TRIIODOTHYRONINE (T3): CPT

## 2018-03-27 PROCEDURE — 85025 COMPLETE CBC W/AUTO DIFF WBC: CPT

## 2018-03-27 PROCEDURE — 36415 COLL VENOUS BLD VENIPUNCTURE: CPT

## 2018-03-27 PROCEDURE — 80061 LIPID PANEL: CPT

## 2018-03-27 PROCEDURE — 80053 COMPREHEN METABOLIC PANEL: CPT

## 2018-03-27 PROCEDURE — 83036 HEMOGLOBIN GLYCOSYLATED A1C: CPT

## 2018-03-27 PROCEDURE — 82306 VITAMIN D 25 HYDROXY: CPT

## 2018-04-03 ENCOUNTER — OFFICE VISIT (OUTPATIENT)
Dept: ENDOCRINOLOGY | Facility: MEDICAL CENTER | Age: 53
End: 2018-04-03
Payer: COMMERCIAL

## 2018-04-03 VITALS
HEIGHT: 66 IN | DIASTOLIC BLOOD PRESSURE: 70 MMHG | SYSTOLIC BLOOD PRESSURE: 110 MMHG | BODY MASS INDEX: 35.03 KG/M2 | WEIGHT: 218 LBS | OXYGEN SATURATION: 98 % | HEART RATE: 84 BPM

## 2018-04-03 DIAGNOSIS — E03.8 HYPOTHYROIDISM DUE TO HASHIMOTO'S THYROIDITIS: ICD-10-CM

## 2018-04-03 DIAGNOSIS — E06.3 HYPOTHYROIDISM DUE TO HASHIMOTO'S THYROIDITIS: ICD-10-CM

## 2018-04-03 DIAGNOSIS — Z78.0 MENOPAUSE: ICD-10-CM

## 2018-04-03 DIAGNOSIS — E66.01 MORBID OBESITY, UNSPECIFIED OBESITY TYPE (HCC): ICD-10-CM

## 2018-04-03 PROCEDURE — 99214 OFFICE O/P EST MOD 30 MIN: CPT | Performed by: INTERNAL MEDICINE

## 2018-04-03 RX ORDER — ESTRADIOL 1 MG/1
1 TABLET ORAL DAILY
Qty: 90 TAB | Refills: 2 | Status: SHIPPED | OUTPATIENT
Start: 2018-04-03 | End: 2018-05-04

## 2018-04-03 RX ORDER — MULTIVIT-MIN/IRON/FOLIC ACID/K 18-600-40
4000 CAPSULE ORAL 2 TIMES DAILY
COMMUNITY
End: 2018-05-04

## 2018-04-03 RX ORDER — LEVOTHYROXINE SODIUM 0.2 MG/1
200 TABLET ORAL EVERY MORNING
Qty: 90 TAB | Refills: 2 | Status: SHIPPED | OUTPATIENT
Start: 2018-04-03 | End: 2019-05-17 | Stop reason: SDUPTHER

## 2018-04-03 RX ORDER — LIOTHYRONINE SODIUM 25 UG/1
25 TABLET ORAL
Qty: 90 TAB | Refills: 2 | Status: SHIPPED | OUTPATIENT
Start: 2018-04-03 | End: 2018-05-04

## 2018-04-04 NOTE — PROGRESS NOTES
Endocrinology Clinic Progress Note  PCP: Scarlet Kramer M.D.    HPI:  Rosy Garay is a 52 y.o. old patient who comes in today for review of endocrine problems.    Hypothyroidism: Doing very well on the current doses of levothyroxine and liothyronine.    Menopause: Doing well on estrogen therapy.    Morbid obesity: She has lost close to 20 pounds in the last 3-6 months time frame and has maintained it.  ROS:  Constitutional: nintentional weight loss  Endo: Denies excessive thirst or frequent urination  All other systems were reviewed and were negative.    Past Medical History:  Patient Active Problem List    Diagnosis Date Noted   • Low back pain 11/22/2013   • Radicular pain 11/22/2013   • Anxiety 11/22/2013   • Migraine headache with aura 10/14/2013   • Acute asthma exacerbation 05/13/2013   • Obesity, morbid (more than 100 lbs over ideal weight or BMI > 40) (CMS-[facility]) 05/13/2013   • Vitamin D deficiency 07/17/2012   • Eczema 08/17/2009   • Asthma 08/17/2009   • Hiatal hernia 08/17/2009   • Menopause 08/17/2009   • Hypothyroid 08/17/2009   • Personal history of gastric bypass 08/17/2009   • Hypercholesterolemia 08/17/2009       Medications:    Current Outpatient Prescriptions:   •  Cholecalciferol (VITAMIN D) 2000 units Cap, Take 4,000 Units by mouth 2 Times a Day., Disp: , Rfl:   •  omeprazole (PRILOSEC) 20 MG delayed-release capsule, Take 1 Cap by mouth every day., Disp: 90 Cap, Rfl: 0  •  sucralfate (CARAFATE) 1 GM Tab, Take 1 g by mouth 4 Times a Day,Before Meals and at Bedtime., Disp: , Rfl:   •  ADVAIR DISKUS 500-50 MCG/DOSE AEPB, USE 1 INHALATION ORALLY    TWICE DAILY, Disp: 1 Each, Rfl: 3  •  liothyronine (CYTOMEL) 25 MCG Tab, Take 1 Tab by mouth every day., Disp: 90 Tab, Rfl: 2  •  levothyroxine (SYNTHROID) 200 MCG Tab, Take 1 Tab by mouth every morning. ON A EMPTY STOMACH, Disp: 90 Tab, Rfl: 2  •  estradiol (ESTRACE) 1 MG Tab, Take 1 Tab by mouth every day., Disp: 90 Tab, Rfl: 2  •  progesterone  "(PROMETRIUM) 100 MG Cap, Take 1 Cap by mouth every day., Disp: 90 Cap, Rfl: 2    Labs: Reviewed    Physical Examination:  Vital signs: /70   Pulse 84   Ht 1.676 m (5' 6\")   Wt 98.9 kg (218 lb)   SpO2 98%   BMI 35.19 kg/m²  Body mass index is 35.19 kg/m².  General: No apparent distress, cooperative  Eyes: No scleral icterus, no discharge, normal eyelids  Neck: No abnormal masses on inspection, normal thyroid exam  Resp: Normal effort, clear to auscultation bilaterally  CVS: Regular rate and rhythm, S1 S2 normal, no murmur  Extremities: No lower extremity edema  Abdomen: abdominal obesity present  Musculoskeletal: Normal digits and nails  Skin: No rash on visible skin  Psych: Alert and oriented, normal mood and affect, intact memory and able to make informed decisions.    Assessment and Plan:    1. Menopause  Continue estradiol.    2. Hypothyroidism due to Hashimoto's thyroiditis  continue current doses of levothyroxine and liothyronine.    3. Morbid obesity, unspecified obesity type (CMS-HCC)  Has done fairly well with the weight loss with diet and exercise measures and optimal thyroid hormone replacement.    Return in about 6 months (around 10/3/2018).    Thank you for allowing me to participate in the care of this patient.    Héctor Wang M.D.    CC:   Scarlet Kramer M.D.    This note was created using voice recognition software (Dragon). The accuracy of the dictation is limited by the abilities of the software. I have reviewed the note prior to signing, however some errors in grammar and context are still possible. If you have any questions related to this note please do not hesitate to contact our office.   "

## 2018-04-11 ENCOUNTER — HOSPITAL ENCOUNTER (OUTPATIENT)
Dept: RADIOLOGY | Facility: MEDICAL CENTER | Age: 53
End: 2018-04-11
Attending: INTERNAL MEDICINE
Payer: COMMERCIAL

## 2018-04-11 DIAGNOSIS — Z12.39 SCREENING BREAST EXAMINATION: ICD-10-CM

## 2018-04-11 PROCEDURE — 77067 SCR MAMMO BI INCL CAD: CPT

## 2018-05-04 ENCOUNTER — HOSPITAL ENCOUNTER (OUTPATIENT)
Dept: RADIOLOGY | Facility: MEDICAL CENTER | Age: 53
End: 2018-05-04
Attending: COLON & RECTAL SURGERY
Payer: COMMERCIAL

## 2018-05-04 DIAGNOSIS — Z01.812 PRE-OPERATIVE LABORATORY EXAMINATION: ICD-10-CM

## 2018-05-04 DIAGNOSIS — K21.9 GASTROESOPHAGEAL REFLUX DISEASE, ESOPHAGITIS PRESENCE NOT SPECIFIED: ICD-10-CM

## 2018-05-04 DIAGNOSIS — Z01.810 PRE-OPERATIVE CARDIOVASCULAR EXAMINATION: ICD-10-CM

## 2018-05-04 DIAGNOSIS — S27.809A RUPTURE OF DIAPHRAGM: ICD-10-CM

## 2018-05-04 LAB
ANION GAP SERPL CALC-SCNC: 6 MMOL/L (ref 0–11.9)
BUN SERPL-MCNC: 15 MG/DL (ref 8–22)
CALCIUM SERPL-MCNC: 9.4 MG/DL (ref 8.5–10.5)
CHLORIDE SERPL-SCNC: 107 MMOL/L (ref 96–112)
CO2 SERPL-SCNC: 27 MMOL/L (ref 20–33)
CREAT SERPL-MCNC: 0.87 MG/DL (ref 0.5–1.4)
ERYTHROCYTE [DISTWIDTH] IN BLOOD BY AUTOMATED COUNT: 41.6 FL (ref 35.9–50)
GLUCOSE SERPL-MCNC: 94 MG/DL (ref 65–99)
HCT VFR BLD AUTO: 44.7 % (ref 37–47)
HGB BLD-MCNC: 14.9 G/DL (ref 12–16)
MCH RBC QN AUTO: 30.1 PG (ref 27–33)
MCHC RBC AUTO-ENTMCNC: 33.3 G/DL (ref 33.6–35)
MCV RBC AUTO: 90.3 FL (ref 81.4–97.8)
PLATELET # BLD AUTO: 282 K/UL (ref 164–446)
PMV BLD AUTO: 10.4 FL (ref 9–12.9)
POTASSIUM SERPL-SCNC: 4.3 MMOL/L (ref 3.6–5.5)
RBC # BLD AUTO: 4.95 M/UL (ref 4.2–5.4)
SODIUM SERPL-SCNC: 140 MMOL/L (ref 135–145)
WBC # BLD AUTO: 7.3 K/UL (ref 4.8–10.8)

## 2018-05-04 PROCEDURE — 74241 DX-UPPER GI-SERIES WITH KUB: CPT

## 2018-05-04 PROCEDURE — 80048 BASIC METABOLIC PNL TOTAL CA: CPT

## 2018-05-04 PROCEDURE — 36415 COLL VENOUS BLD VENIPUNCTURE: CPT

## 2018-05-04 PROCEDURE — 85027 COMPLETE CBC AUTOMATED: CPT

## 2018-05-04 RX ORDER — LIOTHYRONINE SODIUM 25 UG/1
25 TABLET ORAL EVERY MORNING
COMMUNITY
End: 2019-05-17 | Stop reason: SDUPTHER

## 2018-05-04 RX ORDER — OMEPRAZOLE 20 MG/1
20 CAPSULE, DELAYED RELEASE ORAL 2 TIMES DAILY
COMMUNITY
End: 2019-11-05

## 2018-05-04 RX ORDER — ESTRADIOL 1 MG/1
1 TABLET ORAL
COMMUNITY
End: 2019-07-03

## 2018-05-04 RX ORDER — MULTIVITAMIN WITH IRON
1 TABLET ORAL
COMMUNITY
End: 2018-07-25

## 2018-05-16 ENCOUNTER — HOSPITAL ENCOUNTER (INPATIENT)
Facility: MEDICAL CENTER | Age: 53
LOS: 1 days | DRG: 328 | End: 2018-05-17
Attending: COLON & RECTAL SURGERY | Admitting: COLON & RECTAL SURGERY
Payer: COMMERCIAL

## 2018-05-16 PROCEDURE — 501571 HCHG TROCAR, SEPARATOR 12X100: Performed by: COLON & RECTAL SURGERY

## 2018-05-16 PROCEDURE — 500521 HCHG ENDOSTITCH LOAD UNIT: Performed by: COLON & RECTAL SURGERY

## 2018-05-16 PROCEDURE — 0BUT4KZ SUPPLEMENT DIAPHRAGM WITH NONAUTOLOGOUS TISSUE SUBSTITUTE, PERCUTANEOUS ENDOSCOPIC APPROACH: ICD-10-PCS | Performed by: COLON & RECTAL SURGERY

## 2018-05-16 PROCEDURE — 501570 HCHG TROCAR, SEPARATOR: Performed by: COLON & RECTAL SURGERY

## 2018-05-16 PROCEDURE — 700111 HCHG RX REV CODE 636 W/ 250 OVERRIDE (IP): Performed by: PHYSICIAN ASSISTANT

## 2018-05-16 PROCEDURE — 160029 HCHG SURGERY MINUTES - 1ST 30 MINS LEVEL 4: Performed by: COLON & RECTAL SURGERY

## 2018-05-16 PROCEDURE — 160048 HCHG OR STATISTICAL LEVEL 1-5: Performed by: COLON & RECTAL SURGERY

## 2018-05-16 PROCEDURE — 8E0W4CZ ROBOTIC ASSISTED PROCEDURE OF TRUNK REGION, PERCUTANEOUS ENDOSCOPIC APPROACH: ICD-10-PCS | Performed by: COLON & RECTAL SURGERY

## 2018-05-16 PROCEDURE — A9270 NON-COVERED ITEM OR SERVICE: HCPCS | Performed by: PHYSICIAN ASSISTANT

## 2018-05-16 PROCEDURE — 700101 HCHG RX REV CODE 250

## 2018-05-16 PROCEDURE — 770006 HCHG ROOM/CARE - MED/SURG/GYN SEMI*

## 2018-05-16 PROCEDURE — 700101 HCHG RX REV CODE 250: Performed by: PHYSICIAN ASSISTANT

## 2018-05-16 PROCEDURE — 700102 HCHG RX REV CODE 250 W/ 637 OVERRIDE(OP): Performed by: PHYSICIAN ASSISTANT

## 2018-05-16 PROCEDURE — 501838 HCHG SUTURE GENERAL: Performed by: COLON & RECTAL SURGERY

## 2018-05-16 PROCEDURE — 160009 HCHG ANES TIME/MIN: Performed by: COLON & RECTAL SURGERY

## 2018-05-16 PROCEDURE — 502000 HCHG MISC OR IMPLANTS RC 0278: Performed by: COLON & RECTAL SURGERY

## 2018-05-16 PROCEDURE — 500522 HCHG ENDOSTITCH SUTURING DEVICE: Performed by: COLON & RECTAL SURGERY

## 2018-05-16 PROCEDURE — 160035 HCHG PACU - 1ST 60 MINS PHASE I: Performed by: COLON & RECTAL SURGERY

## 2018-05-16 PROCEDURE — 501574 HCHG TROCAR, SMTH CAN&SEAL 5: Performed by: COLON & RECTAL SURGERY

## 2018-05-16 PROCEDURE — 502571 HCHG PACK, LAP CHOLE: Performed by: COLON & RECTAL SURGERY

## 2018-05-16 PROCEDURE — 160036 HCHG PACU - EA ADDL 30 MINS PHASE I: Performed by: COLON & RECTAL SURGERY

## 2018-05-16 PROCEDURE — 700102 HCHG RX REV CODE 250 W/ 637 OVERRIDE(OP)

## 2018-05-16 PROCEDURE — 700111 HCHG RX REV CODE 636 W/ 250 OVERRIDE (IP)

## 2018-05-16 PROCEDURE — 160002 HCHG RECOVERY MINUTES (STAT): Performed by: COLON & RECTAL SURGERY

## 2018-05-16 PROCEDURE — 700111 HCHG RX REV CODE 636 W/ 250 OVERRIDE (IP): Performed by: ANESTHESIOLOGY

## 2018-05-16 PROCEDURE — 0DNU4ZZ RELEASE OMENTUM, PERCUTANEOUS ENDOSCOPIC APPROACH: ICD-10-PCS | Performed by: COLON & RECTAL SURGERY

## 2018-05-16 PROCEDURE — A9270 NON-COVERED ITEM OR SERVICE: HCPCS

## 2018-05-16 PROCEDURE — 160041 HCHG SURGERY MINUTES - EA ADDL 1 MIN LEVEL 4: Performed by: COLON & RECTAL SURGERY

## 2018-05-16 DEVICE — MATRIX PLUS SURGICAL MATRISTEM THICK 7CM X 10CM: Type: IMPLANTABLE DEVICE | Status: FUNCTIONAL

## 2018-05-16 RX ORDER — ENALAPRILAT 1.25 MG/ML
2.5 INJECTION INTRAVENOUS EVERY 6 HOURS PRN
Status: DISCONTINUED | OUTPATIENT
Start: 2018-05-16 | End: 2018-05-17 | Stop reason: HOSPADM

## 2018-05-16 RX ORDER — OMEPRAZOLE 20 MG/1
20 CAPSULE, DELAYED RELEASE ORAL 2 TIMES DAILY
Status: DISCONTINUED | OUTPATIENT
Start: 2018-05-16 | End: 2018-05-17 | Stop reason: HOSPADM

## 2018-05-16 RX ORDER — TRAZODONE HYDROCHLORIDE 50 MG/1
50 TABLET ORAL NIGHTLY PRN
Status: DISCONTINUED | OUTPATIENT
Start: 2018-05-16 | End: 2018-05-17 | Stop reason: HOSPADM

## 2018-05-16 RX ORDER — SODIUM CHLORIDE AND POTASSIUM CHLORIDE 150; 900 MG/100ML; MG/100ML
INJECTION, SOLUTION INTRAVENOUS CONTINUOUS
Status: DISCONTINUED | OUTPATIENT
Start: 2018-05-16 | End: 2018-05-17 | Stop reason: HOSPADM

## 2018-05-16 RX ORDER — ESTRADIOL 1 MG/1
1 TABLET ORAL
Status: DISCONTINUED | OUTPATIENT
Start: 2018-05-16 | End: 2018-05-17 | Stop reason: HOSPADM

## 2018-05-16 RX ORDER — PROMETHAZINE HYDROCHLORIDE 25 MG/1
25 SUPPOSITORY RECTAL EVERY 6 HOURS PRN
Status: DISCONTINUED | OUTPATIENT
Start: 2018-05-16 | End: 2018-05-17 | Stop reason: HOSPADM

## 2018-05-16 RX ORDER — CALCIUM CARBONATE 500 MG/1
500 TABLET, CHEWABLE ORAL
Status: DISCONTINUED | OUTPATIENT
Start: 2018-05-16 | End: 2018-05-17 | Stop reason: HOSPADM

## 2018-05-16 RX ORDER — SCOLOPAMINE TRANSDERMAL SYSTEM 1 MG/1
1 PATCH, EXTENDED RELEASE TRANSDERMAL
Status: DISCONTINUED | OUTPATIENT
Start: 2018-05-16 | End: 2018-05-17 | Stop reason: HOSPADM

## 2018-05-16 RX ORDER — ONDANSETRON 2 MG/ML
4 INJECTION INTRAMUSCULAR; INTRAVENOUS EVERY 4 HOURS PRN
Status: DISCONTINUED | OUTPATIENT
Start: 2018-05-16 | End: 2018-05-17 | Stop reason: HOSPADM

## 2018-05-16 RX ORDER — MORPHINE SULFATE 10 MG/ML
INJECTION, SOLUTION INTRAMUSCULAR; INTRAVENOUS
Status: COMPLETED
Start: 2018-05-16 | End: 2018-05-16

## 2018-05-16 RX ORDER — SUMATRIPTAN 6 MG/.5ML
6 INJECTION, SOLUTION SUBCUTANEOUS ONCE
Status: COMPLETED | OUTPATIENT
Start: 2018-05-16 | End: 2018-05-16

## 2018-05-16 RX ORDER — BUPIVACAINE HYDROCHLORIDE AND EPINEPHRINE 5; 5 MG/ML; UG/ML
INJECTION, SOLUTION EPIDURAL; INTRACAUDAL; PERINEURAL
Status: DISCONTINUED | OUTPATIENT
Start: 2018-05-16 | End: 2018-05-16 | Stop reason: HOSPADM

## 2018-05-16 RX ORDER — ACETAMINOPHEN 500 MG
1000 TABLET ORAL EVERY 6 HOURS
Status: DISCONTINUED | OUTPATIENT
Start: 2018-05-16 | End: 2018-05-17 | Stop reason: HOSPADM

## 2018-05-16 RX ORDER — CLOTRIMAZOLE AND BETAMETHASONE DIPROPIONATE 10; .64 MG/G; MG/G
1 CREAM TOPICAL 2 TIMES DAILY PRN
COMMUNITY

## 2018-05-16 RX ORDER — OXYCODONE HCL 5 MG/5 ML
SOLUTION, ORAL ORAL
Status: COMPLETED
Start: 2018-05-16 | End: 2018-05-16

## 2018-05-16 RX ORDER — LIOTHYRONINE SODIUM 25 UG/1
25 TABLET ORAL
Status: DISCONTINUED | OUTPATIENT
Start: 2018-05-16 | End: 2018-05-17 | Stop reason: HOSPADM

## 2018-05-16 RX ORDER — LEVOTHYROXINE SODIUM 0.05 MG/1
200 TABLET ORAL
Status: DISCONTINUED | OUTPATIENT
Start: 2018-05-16 | End: 2018-05-17 | Stop reason: HOSPADM

## 2018-05-16 RX ORDER — BUDESONIDE AND FORMOTEROL FUMARATE DIHYDRATE 160; 4.5 UG/1; UG/1
2 AEROSOL RESPIRATORY (INHALATION) 2 TIMES DAILY
Status: DISCONTINUED | OUTPATIENT
Start: 2018-05-16 | End: 2018-05-17 | Stop reason: HOSPADM

## 2018-05-16 RX ORDER — HALOPERIDOL 5 MG/ML
1 INJECTION INTRAMUSCULAR EVERY 6 HOURS PRN
Status: DISCONTINUED | OUTPATIENT
Start: 2018-05-16 | End: 2018-05-17 | Stop reason: HOSPADM

## 2018-05-16 RX ORDER — MORPHINE SULFATE 10 MG/ML
4 INJECTION, SOLUTION INTRAMUSCULAR; INTRAVENOUS
Status: DISCONTINUED | OUTPATIENT
Start: 2018-05-16 | End: 2018-05-17 | Stop reason: HOSPADM

## 2018-05-16 RX ORDER — SUCRALFATE 1 G/1
1 TABLET ORAL
Status: DISCONTINUED | OUTPATIENT
Start: 2018-05-16 | End: 2018-05-17 | Stop reason: HOSPADM

## 2018-05-16 RX ORDER — LIDOCAINE HYDROCHLORIDE 10 MG/ML
0.5 INJECTION, SOLUTION INFILTRATION; PERINEURAL
Status: ACTIVE | OUTPATIENT
Start: 2018-05-16 | End: 2018-05-17

## 2018-05-16 RX ORDER — SODIUM CHLORIDE, SODIUM LACTATE, POTASSIUM CHLORIDE, CALCIUM CHLORIDE 600; 310; 30; 20 MG/100ML; MG/100ML; MG/100ML; MG/100ML
1000 INJECTION, SOLUTION INTRAVENOUS
Status: COMPLETED | OUTPATIENT
Start: 2018-05-16 | End: 2018-05-16

## 2018-05-16 RX ORDER — DIPHENHYDRAMINE HYDROCHLORIDE 50 MG/ML
25 INJECTION INTRAMUSCULAR; INTRAVENOUS EVERY 6 HOURS PRN
Status: DISCONTINUED | OUTPATIENT
Start: 2018-05-16 | End: 2018-05-17 | Stop reason: HOSPADM

## 2018-05-16 RX ORDER — OXYCODONE HYDROCHLORIDE 10 MG/1
10 TABLET ORAL
Status: DISCONTINUED | OUTPATIENT
Start: 2018-05-16 | End: 2018-05-17 | Stop reason: HOSPADM

## 2018-05-16 RX ADMIN — ESTRADIOL 1 MG: 1 TABLET ORAL at 22:16

## 2018-05-16 RX ADMIN — ACETAMINOPHEN 1000 MG: 500 TABLET ORAL at 17:30

## 2018-05-16 RX ADMIN — PROGESTERONE 100 MG: 100 CAPSULE ORAL at 22:16

## 2018-05-16 RX ADMIN — SUMATRIPTAN SUCCINATE 6 MG: 6 INJECTION SUBCUTANEOUS at 12:35

## 2018-05-16 RX ADMIN — FENTANYL CITRATE 50 MCG: 50 INJECTION, SOLUTION INTRAMUSCULAR; INTRAVENOUS at 10:57

## 2018-05-16 RX ADMIN — MORPHINE SULFATE 2 MG: 10 INJECTION INTRAVENOUS at 11:15

## 2018-05-16 RX ADMIN — SUCRALFATE 1 G: 1 TABLET ORAL at 17:31

## 2018-05-16 RX ADMIN — FENTANYL CITRATE 50 MCG: 50 INJECTION, SOLUTION INTRAMUSCULAR; INTRAVENOUS at 10:51

## 2018-05-16 RX ADMIN — SUCRALFATE 1 G: 1 TABLET ORAL at 22:00

## 2018-05-16 RX ADMIN — BUDESONIDE AND FORMOTEROL FUMARATE DIHYDRATE 2 PUFF: 160; 4.5 AEROSOL RESPIRATORY (INHALATION) at 22:15

## 2018-05-16 RX ADMIN — LEVOTHYROXINE SODIUM 200 MCG: 50 TABLET ORAL at 17:44

## 2018-05-16 RX ADMIN — OMEPRAZOLE 20 MG: 20 CAPSULE, DELAYED RELEASE ORAL at 22:00

## 2018-05-16 RX ADMIN — FENTANYL CITRATE 50 MCG: 50 INJECTION, SOLUTION INTRAMUSCULAR; INTRAVENOUS at 13:01

## 2018-05-16 RX ADMIN — POTASSIUM CHLORIDE AND SODIUM CHLORIDE: 900; 150 INJECTION, SOLUTION INTRAVENOUS at 17:32

## 2018-05-16 RX ADMIN — MORPHINE SULFATE 2 MG: 10 INJECTION INTRAVENOUS at 12:04

## 2018-05-16 RX ADMIN — OXYCODONE HYDROCHLORIDE 10 MG: 5 SOLUTION ORAL at 11:45

## 2018-05-16 RX ADMIN — MORPHINE SULFATE 3 MG: 10 INJECTION INTRAVENOUS at 12:25

## 2018-05-16 RX ADMIN — MORPHINE SULFATE 3 MG: 10 INJECTION INTRAVENOUS at 11:36

## 2018-05-16 RX ADMIN — ENOXAPARIN SODIUM 30 MG: 100 INJECTION SUBCUTANEOUS at 22:00

## 2018-05-16 RX ADMIN — SODIUM CHLORIDE, SODIUM LACTATE, POTASSIUM CHLORIDE, CALCIUM CHLORIDE 1000 ML: 600; 310; 30; 20 INJECTION, SOLUTION INTRAVENOUS at 08:22

## 2018-05-16 ASSESSMENT — PAIN SCALES - GENERAL
PAINLEVEL_OUTOF10: 7
PAINLEVEL_OUTOF10: 0
PAINLEVEL_OUTOF10: 4
PAINLEVEL_OUTOF10: 5
PAINLEVEL_OUTOF10: 9
PAINLEVEL_OUTOF10: 3
PAINLEVEL_OUTOF10: 3
PAINLEVEL_OUTOF10: 0
PAINLEVEL_OUTOF10: 6
PAINLEVEL_OUTOF10: 8
PAINLEVEL_OUTOF10: 6
PAINLEVEL_OUTOF10: 8
PAINLEVEL_OUTOF10: 9
PAINLEVEL_OUTOF10: 4
PAINLEVEL_OUTOF10: 4
PAINLEVEL_OUTOF10: 8
PAINLEVEL_OUTOF10: 4
PAINLEVEL_OUTOF10: 3
PAINLEVEL_OUTOF10: 5
PAINLEVEL_OUTOF10: 0

## 2018-05-16 ASSESSMENT — PATIENT HEALTH QUESTIONNAIRE - PHQ9
2. FEELING DOWN, DEPRESSED, IRRITABLE, OR HOPELESS: NOT AT ALL
SUM OF ALL RESPONSES TO PHQ9 QUESTIONS 1 AND 2: 0
1. LITTLE INTEREST OR PLEASURE IN DOING THINGS: NOT AT ALL

## 2018-05-16 ASSESSMENT — LIFESTYLE VARIABLES: EVER_SMOKED: YES

## 2018-05-16 NOTE — OR NURSING
Pt on 1 L NC at this time.  No c/o nausea, tolerating PO fluids.  3/10 pain, x5 abdominal surgical incisions dressings intact, scant serosanguineous weeping. VSS, afebrile.  A/O x4.

## 2018-05-16 NOTE — PROGRESS NOTES
The Medication Reconciliation process has been completed by interviewing the patient    Allergies have been reviewed  Antibiotic use in 30 days - none    [facility]:  [facility]

## 2018-05-16 NOTE — OP REPORT
NAME:  Roys Garay  MRN:  [id number]  :  1965      DATE OF OPERATION: 2018    PREOPERATIVE DIAGNOSIS:  Recurrent hiatal hernia with GERD; prior RYGB    POSTOPERATIVE DIAGNOSIS: Recurrent hiatal hernia with GERD; adhesions, prior RYGB    OPERATION PERFORMED:   1. Laparoscopic reduction of incarcerated gastric herniation.   2. Hiatal hernia repair with xenograft reinforcement.   3. Adhesiolysis      SURGEON: Jamaal Aaron MD    ASSISTANT:  Ryan John PA-C    ANESTHESIOLOGIST:  Kurt Roberts MD., MD    ANESTHESIA: General endotracheal anesthesia.     SPECIMEN: none    ESTIMATED BLOOD LOSS: <10cc.     INDICATIONS: The patient is a 53 y.o. female with a diagnosis of hiatal hernia with worsening GERD. She comes today for surgical repair.  After a remote past history of RYGB, she is taken to the operating room today for Laparoscopic Hiatal hernia repair.    DETAILS OF PROCEDURE: After an extensive informed consent discussion process, the patient was brought to the operating room. She was placed in the supine position on the operating table. After induction of general anesthesia and placement of an endotracheal tube, the abdomen was prepped and draped in the usual sterile fashion. After administration of intravenous antibiotics, a bladeless optical entry trocar was carefully inserted into the abdomen. Pneumoperitoneum was established in the usual fashion. A bladeless 5 mm separator trocar was introduced. The laparoscope was introduced. Three additional separator trocars were placed in the upper abdomen and a 5 mm epigastric Christy-type liver retractor was placed to elevate the left lateral segment of the liver,   it was secured to the patient's right side with a robot arm.     Careful examination demonstrated adhesions and a moderately large 6cm anterior hiatal hernia with a portion of the gastric pouch herniated above the diaphragm. The table was placed in reverse Trendelenburg position and  gradually the stomach and omentum were reduced. Adhesiolsysis was performed safely. The attachments were slowly divided with the LigaSure device and the hernia sac was gradually freed allowing us to fully reduce the stomach. There was no esophageal shortening. Old crural sutures were intact posteriolrly; anteriorly they were visible but the crura had pulled apart. The left and right crura were well exposed circumferentially. We began with posterior crural approximating sutures using 0 Ethibond endo-stitches. A series of anterior crural approximating sutures were also placed in a similar fashion, and a single posterior cruoplasty suture was placed. A 42-[place] blunt tipped bougie was passed down well into the stomach.     A Gentrix [id number] hernia xenograft was soaked in saline solution and then a fenestrated keyhole cut, so that it would reside nicely around the gastroesophageal junction. It was laparoscopically placed and maneuvered into position, so as to help reinforce the crural closure. The graft was sutured to the crural closure with Polysorb Endo stitches with the rough regenerative side facing toward the muscle closure and the smooth side facing toward the gastric serosa.     After final inspections demonstrated a nice result, a methylene blue test was performed with a VisiG orogastric tube, distending the gastric pouch and esophagus and gastrojejunal anstamosis area - there was no leak, The tube was removed. The liver retractor was then removed. The pneumoperitoneum was allowed to escape. The ports were removed under direct vision. The port sites were irrigated and closed with Vicryl sutures. Steri-Strips and sterile dressings were applied.    The patient tolerated the procedure well and there were no apparent complications. All sponge, needle, and instrument counts were correct on 2 separate occasions. She was awakened, extubated, and transferred to the recovery room in satisfactory condition.        ____________________________________   Jamaal Aaron MD  DD: 5/16/2018  10:44 AM    CC:  [facility];

## 2018-05-17 VITALS
OXYGEN SATURATION: 96 % | DIASTOLIC BLOOD PRESSURE: 74 MMHG | BODY MASS INDEX: 37.17 KG/M2 | SYSTOLIC BLOOD PRESSURE: 121 MMHG | RESPIRATION RATE: 16 BRPM | HEIGHT: 66 IN | TEMPERATURE: 97.9 F | HEART RATE: 61 BPM | WEIGHT: 231.26 LBS

## 2018-05-17 LAB
ALBUMIN SERPL BCP-MCNC: 3.8 G/DL (ref 3.2–4.9)
ALBUMIN/GLOB SERPL: 1.6 G/DL
ALP SERPL-CCNC: 74 U/L (ref 30–99)
ALT SERPL-CCNC: 243 U/L (ref 2–50)
ANION GAP SERPL CALC-SCNC: 7 MMOL/L (ref 0–11.9)
AST SERPL-CCNC: 288 U/L (ref 12–45)
BASOPHILS # BLD AUTO: 0.4 % (ref 0–1.8)
BASOPHILS # BLD: 0.04 K/UL (ref 0–0.12)
BILIRUB SERPL-MCNC: 0.7 MG/DL (ref 0.1–1.5)
BUN SERPL-MCNC: 12 MG/DL (ref 8–22)
CALCIUM SERPL-MCNC: 8.7 MG/DL (ref 8.5–10.5)
CHLORIDE SERPL-SCNC: 106 MMOL/L (ref 96–112)
CO2 SERPL-SCNC: 25 MMOL/L (ref 20–33)
CREAT SERPL-MCNC: 0.77 MG/DL (ref 0.5–1.4)
EOSINOPHIL # BLD AUTO: 0 K/UL (ref 0–0.51)
EOSINOPHIL NFR BLD: 0 % (ref 0–6.9)
ERYTHROCYTE [DISTWIDTH] IN BLOOD BY AUTOMATED COUNT: 40.6 FL (ref 35.9–50)
GLOBULIN SER CALC-MCNC: 2.4 G/DL (ref 1.9–3.5)
GLUCOSE SERPL-MCNC: 113 MG/DL (ref 65–99)
HCT VFR BLD AUTO: 39.3 % (ref 37–47)
HGB BLD-MCNC: 13.4 G/DL (ref 12–16)
IMM GRANULOCYTES # BLD AUTO: 0.03 K/UL (ref 0–0.11)
IMM GRANULOCYTES NFR BLD AUTO: 0.3 % (ref 0–0.9)
LYMPHOCYTES # BLD AUTO: 1.42 K/UL (ref 1–4.8)
LYMPHOCYTES NFR BLD: 13.6 % (ref 22–41)
MCH RBC QN AUTO: 30.5 PG (ref 27–33)
MCHC RBC AUTO-ENTMCNC: 34.1 G/DL (ref 33.6–35)
MCV RBC AUTO: 89.5 FL (ref 81.4–97.8)
MONOCYTES # BLD AUTO: 0.8 K/UL (ref 0–0.85)
MONOCYTES NFR BLD AUTO: 7.7 % (ref 0–13.4)
NEUTROPHILS # BLD AUTO: 8.15 K/UL (ref 2–7.15)
NEUTROPHILS NFR BLD: 78 % (ref 44–72)
NRBC # BLD AUTO: 0 K/UL
NRBC BLD-RTO: 0 /100 WBC
PLATELET # BLD AUTO: 275 K/UL (ref 164–446)
PMV BLD AUTO: 10.7 FL (ref 9–12.9)
POTASSIUM SERPL-SCNC: 4.2 MMOL/L (ref 3.6–5.5)
PROT SERPL-MCNC: 6.2 G/DL (ref 6–8.2)
RBC # BLD AUTO: 4.39 M/UL (ref 4.2–5.4)
SODIUM SERPL-SCNC: 138 MMOL/L (ref 135–145)
WBC # BLD AUTO: 10.4 K/UL (ref 4.8–10.8)

## 2018-05-17 PROCEDURE — A9270 NON-COVERED ITEM OR SERVICE: HCPCS | Performed by: PHYSICIAN ASSISTANT

## 2018-05-17 PROCEDURE — 36415 COLL VENOUS BLD VENIPUNCTURE: CPT

## 2018-05-17 PROCEDURE — 90732 PPSV23 VACC 2 YRS+ SUBQ/IM: CPT | Performed by: COLON & RECTAL SURGERY

## 2018-05-17 PROCEDURE — 700111 HCHG RX REV CODE 636 W/ 250 OVERRIDE (IP): Performed by: PHYSICIAN ASSISTANT

## 2018-05-17 PROCEDURE — 90471 IMMUNIZATION ADMIN: CPT

## 2018-05-17 PROCEDURE — 700111 HCHG RX REV CODE 636 W/ 250 OVERRIDE (IP): Performed by: COLON & RECTAL SURGERY

## 2018-05-17 PROCEDURE — 700101 HCHG RX REV CODE 250: Performed by: PHYSICIAN ASSISTANT

## 2018-05-17 PROCEDURE — 85025 COMPLETE CBC W/AUTO DIFF WBC: CPT

## 2018-05-17 PROCEDURE — 80053 COMPREHEN METABOLIC PANEL: CPT

## 2018-05-17 PROCEDURE — 700102 HCHG RX REV CODE 250 W/ 637 OVERRIDE(OP): Performed by: PHYSICIAN ASSISTANT

## 2018-05-17 RX ADMIN — POTASSIUM CHLORIDE AND SODIUM CHLORIDE: 900; 150 INJECTION, SOLUTION INTRAVENOUS at 05:43

## 2018-05-17 RX ADMIN — ENOXAPARIN SODIUM 30 MG: 100 INJECTION SUBCUTANEOUS at 07:56

## 2018-05-17 RX ADMIN — SUCRALFATE 1 G: 1 TABLET ORAL at 05:43

## 2018-05-17 RX ADMIN — PNEUMOCOCCAL VACCINE POLYVALENT 25 MCG
25; 25; 25; 25; 25; 25; 25; 25; 25; 25; 25; 25; 25; 25; 25; 25; 25; 25; 25; 25; 25; 25; 25 INJECTION, SOLUTION INTRAMUSCULAR; SUBCUTANEOUS at 06:32

## 2018-05-17 RX ADMIN — LIOTHYRONINE SODIUM 25 MCG: 25 TABLET ORAL at 05:43

## 2018-05-17 RX ADMIN — POTASSIUM CHLORIDE AND SODIUM CHLORIDE: 900; 150 INJECTION, SOLUTION INTRAVENOUS at 01:42

## 2018-05-17 RX ADMIN — OMEPRAZOLE 20 MG: 20 CAPSULE, DELAYED RELEASE ORAL at 07:56

## 2018-05-17 RX ADMIN — LEVOTHYROXINE SODIUM 200 MCG: 50 TABLET ORAL at 05:43

## 2018-05-17 RX ADMIN — ACETAMINOPHEN 1000 MG: 500 TABLET ORAL at 01:41

## 2018-05-17 RX ADMIN — ACETAMINOPHEN 1000 MG: 500 TABLET ORAL at 05:43

## 2018-05-17 ASSESSMENT — PAIN SCALES - GENERAL
PAINLEVEL_OUTOF10: 3
PAINLEVEL_OUTOF10: 4
PAINLEVEL_OUTOF10: 0

## 2018-05-17 ASSESSMENT — ENCOUNTER SYMPTOMS
DIARRHEA: 0
NAUSEA: 0
ABDOMINAL PAIN: 1
CONSTIPATION: 0
HEARTBURN: 0
SHORTNESS OF BREATH: 0
CHILLS: 0
FEVER: 0
COUGH: 0
VOMITING: 0

## 2018-05-17 ASSESSMENT — PATIENT HEALTH QUESTIONNAIRE - PHQ9
1. LITTLE INTEREST OR PLEASURE IN DOING THINGS: NOT AT ALL
SUM OF ALL RESPONSES TO PHQ9 QUESTIONS 1 AND 2: 0
2. FEELING DOWN, DEPRESSED, IRRITABLE, OR HOPELESS: NOT AT ALL

## 2018-05-17 ASSESSMENT — LIFESTYLE VARIABLES: ALCOHOL_USE: NO

## 2018-05-17 NOTE — DISCHARGE INSTRUCTIONS
Discharge Instructions    Discharged to home by car with relative. Discharged via wheelchair, hospital escort: Yes.  Special equipment needed: Not Applicable    Be sure to schedule a follow-up appointment with your primary care doctor or any specialists as instructed.     Discharge Plan:   Diet Plan: Discussed  Activity Level: Discussed  Confirmed Follow up Appointment: Patient to Call and Schedule Appointment  Confirmed Symptoms Management: Discussed  Medication Reconciliation Updated: Yes  Pneumococcal Vaccine Administered/Refused: Given (See MAR)  Influenza Vaccine Indication: Not indicated: Previously immunized this influenza season and > 8 years of age    I understand that a diet low in cholesterol, fat, and sodium is recommended for good health. Unless I have been given specific instructions below for another diet, I accept this instruction as my diet prescription.   Other diet: Start slow with liquids then incorporate soft foods as tolerated  Special Instructions:       Discharge Instructions:     1. DIET: Upon discharge from the hospital you may resume your normal preoperative diet. Depending on how you are feeling and whether you have nausea or not, you may wish to stay with a bland diet for the first few days. However, you can advance this as quickly as you feel ready.     2. ACTIVITIES: After discharge from the hospital, you may resume full routine activities. However, there should be no heavy lifting (greater than 15 pounds) and no strenuous activities until after your follow-up visit. Otherwise, routine activities of daily living are acceptable.     3. DRIVING: You may drive whenever you are off pain medications and are able to perform the activities needed to drive, i.e. turning, bending, twisting, etc.     4. BATHING: You may get the wound wet at any time after leaving the hospital. You may shower, but do not submerge in a bath for at least a week. Dressings may come off after 48 hours.     5. BOWEL  FUNCTION: Constipation is common after an operation, especially with pain medications. The combination of pain medication and decreased activity level can cause constipation in otherwise normal patients. If you feel this is occurring, take a laxative (Miralax, Milk of Magnesia, Ex-Lax, Senokot, etc.) until the problem has resolved.     6. PAIN MEDICATION: You will be given a prescription for pain medication at discharge. Please take these as directed. It is important to remember not to take medications on an empty stomach as this may cause nausea.     7.CALL IF YOU HAVE: (1) Fevers to more than 101.o0 F, (2) Unusual chest or leg pain, (3) Drainage or fluid from incision that may be foul smelling, increased tenderness or soreness at the wound or the wound edges are no longer together, redness or swelling at the incision site. Please do not hesitate to call with any other questions.     8. APPOINTMENT: Contact our office at [phone] for a follow-up appointment in 1 weeks following your procedure.     If you have any additional questions, please do not hesitate to call the office and speak to either myself or the physician on call.     Office address:   [facility].   [address] [place] [address] [address] [place] [id number]      Hiatal Hernia  A hiatal hernia occurs when part of your stomach slides above the muscle that separates your abdomen from your chest (diaphragm). You can be born with a hiatal hernia (congenital), or it may develop over time. In almost all cases of hiatal hernia, only the top part of the stomach pushes through.   Many people have a hiatal hernia with no symptoms. The larger the hernia, the more likely that you will have symptoms. In some cases, a hiatal hernia allows stomach acid to flow back into the tube that carries food from your mouth to your stomach (esophagus). This may cause heartburn symptoms. Severe heartburn symptoms may mean you have developed a condition called  gastroesophageal reflux disease (GERD).   CAUSES   Hiatal hernias are caused by a weakness in the opening (hiatus) where your esophagus passes through your diaphragm to attach to the upper part of your stomach. You may be born with a weakness in your hiatus, or a weakness can develop.  RISK FACTORS  Older age is a major risk factor for a hiatal hernia. Anything that increases pressure on your diaphragm can also increase your risk of a hiatal hernia. This includes:  · Pregnancy.  · Excess weight.  · Frequent constipation.  SIGNS AND SYMPTOMS   People with a hiatal hernia often have no symptoms. If symptoms develop, they are almost always caused by GERD. They may include:  · Heartburn.  · Belching.  · Indigestion.  · Trouble swallowing.  · Coughing or wheezing.   · Sore throat.  · Hoarseness.  · Chest pain.  DIAGNOSIS   A hiatal hernia is sometimes found during an exam for another problem. Your health care provider may suspect a hiatal hernia if you have symptoms of GERD. Tests may be done to diagnose GERD. These may include:  · X-rays of your stomach or chest.  · An upper gastrointestinal (GI) series. This is an X-ray exam of your GI tract involving the use of a chalky liquid that you swallow. The liquid shows up clearly on the X-ray.  · Endoscopy. This is a procedure to look into your stomach using a thin, flexible tube that has a tiny camera and light on the end of it.  TREATMENT   If you have no symptoms, you may not need treatment. If you have symptoms, treatment may include:  · Dietary and lifestyle changes to help reduce GERD symptoms.  · Medicines. These may include:  ¨ Over-the-counter antacids.  ¨ Medicines that make your stomach empty more quickly.  ¨ Medicines that block the production of stomach acid (H2 blockers).  ¨ Stronger medicines to reduce stomach acid (proton pump inhibitors).  · You may need surgery to repair the hernia if other treatments are not helping.  HOME CARE INSTRUCTIONS   · Take all  medicines as directed by your health care provider.  · Quit smoking, if you smoke.  · Try to achieve and maintain a healthy body weight.  · Eat frequent small meals instead of three large meals a day. This keeps your stomach from getting too full.  ¨ Eat slowly.  ¨ Do not lie down right after eating.   ¨ Do not eat 1-2 hours before bed.    · Do not drink beverages with caffeine. These include cola, coffee, cocoa, and tea.  · Do not drink alcohol.  · Avoid foods that can make symptoms of GERD worse. These may include:  ¨ Fatty foods.  ¨ Citrus fruits.  ¨ Other foods and drinks that contain acid.  · Avoid putting pressure on your belly. Anything that puts pressure on your belly increases the amount of acid that may be pushed up into your esophagus.    ¨ Avoid bending over, especially after eating.  ¨ Raise the head of your bed by putting blocks under the legs. This keeps your head and esophagus higher than your stomach.  ¨ Do not wear tight clothing around your chest or stomach.  ¨ Try not to strain when having a bowel movement, when urinating, or when lifting heavy objects.  SEEK MEDICAL CARE IF:  · Your symptoms are not controlled with medicines or lifestyle changes.  · You are having trouble swallowing.  · You have coughing or wheezing that will not go away.  SEEK IMMEDIATE MEDICAL CARE IF:  · Your pain is getting worse.  · Your pain spreads to your arms, neck, jaw, teeth, or back.  · You have shortness of breath.  · You sweat for no reason.  · You feel sick to your stomach (nauseous) or vomit.  · You vomit blood.  · You have bright red blood in your stools.  · You have black, tarry stools.    This information is not intended to replace advice given to you by your health care provider. Make sure you discuss any questions you have with your health care provider.  Document Released: 03/09/2005 Document Revised: 04/10/2017 Document Reviewed: 12/05/2014  Elsevier Interactive Patient Education © 2017 Elsevier  Inc.        Depression / Suicide Risk    As you are discharged from this [facility] facility, it is important to learn how to keep safe from harming yourself.    Recognize the warning signs:  · Abrupt changes in personality, positive or negative- including increase in energy   · Giving away possessions  · Change in eating patterns- significant weight changes-  positive or negative  · Change in sleeping patterns- unable to sleep or sleeping all the time   · Unwillingness or inability to communicate  · Depression  · Unusual sadness, discouragement and loneliness  · Talk of wanting to die  · Neglect of personal appearance   · Rebelliousness- reckless behavior  · Withdrawal from people/activities they love  · Confusion- inability to concentrate     If you or a loved one observes any of these behaviors or has concerns about self-harm, here's what you can do:  · Talk about it- your feelings and reasons for harming yourself  · Remove any means that you might use to hurt yourself (examples: pills, rope, extension cords, firearm)  · Get professional help from the community (Mental Health, Substance Abuse, psychological counseling)  · Do not be alone:Call your Safe Contact- someone whom you trust who will be there for you.  · Call your local CRISIS HOTLINE 113-7684 or 593-417-6427  · Call your local Children's Mobile Crisis Response Team [place] (787) 386-5427 or www.[employer]  · Call the toll free National Suicide Prevention Hotlines   · National Suicide Prevention Lifeline 999-191-URLD (8707)  · National Hope Line Network 800-SUICIDE (941-3184)

## 2018-05-17 NOTE — PROGRESS NOTES
Surgical Progress Note    Author: Ryan John Date & Time created: 2018   8:18 AM     Interval Events:  Pt doing okay POD #1 s/p hiatal hernia repair. Denies nausea, vomiting. Ambulating, voiding. Tolerating some PO. Alert and oriented. NAD. Breathing unlabored. Abdomen soft, tender to incision sites. Dressings clean dry intact. VS reviewed. Labs reviewed-elevated liver enzymes. We will see how the day progresses. Pt seen and examined by Dr. Aaron.  Hopefully home later today.   Review of Systems   Constitutional: Negative for chills and fever.   Respiratory: Negative for cough and shortness of breath.    Gastrointestinal: Positive for abdominal pain. Negative for constipation, diarrhea, heartburn, nausea and vomiting.   Skin: Negative for itching and rash.     Hemodynamics:  Temp (24hrs), Av.6 °C (97.8 °F), Min:35.8 °C (96.5 °F), Max:37.5 °C (99.5 °F)  Temperature: 37.5 °C (99.5 °F)  Pulse  Av.9  Min: 60  Max: 94Heart Rate (Monitored): 99  Blood Pressure: 109/76, NIBP: 122/76     Respiratory:    Respiration: 12, Pulse Oximetry: 95 %           Neuro:  GCS       Fluids:    Intake/Output Summary (Last 24 hours) at 18 0818  Last data filed at 18 0500   Gross per 24 hour   Intake             3470 ml   Output             1960 ml   Net             1510 ml        Current Diet Order   Procedures   • Diet Order     Physical Exam   Constitutional: She is oriented to person, place, and time. She appears well-developed and well-nourished. No distress.   Cardiovascular: Normal rate.    Pulmonary/Chest: Effort normal. No respiratory distress.   Abdominal: Soft. She exhibits distension. There is tenderness. There is no rebound and no guarding.   Neurological: She is alert and oriented to person, place, and time.   Skin: Skin is warm and dry. No rash noted. She is not diaphoretic. No erythema.   Psychiatric: She has a normal mood and affect. Her behavior is normal.   Vitals reviewed.    Labs:  Recent  Results (from the past 24 hour(s))   CBC with Differential    Collection Time: 05/17/18  2:41 AM   Result Value Ref Range    WBC 10.4 4.8 - 10.8 K/uL    RBC 4.39 4.20 - 5.40 M/uL    Hemoglobin 13.4 12.0 - 16.0 g/dL    Hematocrit 39.3 37.0 - 47.0 %    MCV 89.5 81.4 - 97.8 fL    MCH 30.5 27.0 - 33.0 pg    MCHC 34.1 33.6 - 35.0 g/dL    RDW 40.6 35.9 - 50.0 fL    Platelet Count 275 164 - 446 K/uL    MPV 10.7 9.0 - 12.9 fL    Neutrophils-Polys 78.00 (H) 44.00 - 72.00 %    Lymphocytes 13.60 (L) 22.00 - 41.00 %    Monocytes 7.70 0.00 - 13.40 %    Eosinophils 0.00 0.00 - 6.90 %    Basophils 0.40 0.00 - 1.80 %    Immature Granulocytes 0.30 0.00 - 0.90 %    Nucleated RBC 0.00 /100 WBC    Neutrophils (Absolute) 8.15 (H) 2.00 - 7.15 K/uL    Lymphs (Absolute) 1.42 1.00 - 4.80 K/uL    Monos (Absolute) 0.80 0.00 - 0.85 K/uL    Eos (Absolute) 0.00 0.00 - 0.51 K/uL    Baso (Absolute) 0.04 0.00 - 0.12 K/uL    Immature Granulocytes (abs) 0.03 0.00 - 0.11 K/uL    NRBC (Absolute) 0.00 K/uL   Comp Metabolic Panel (CMP)    Collection Time: 05/17/18  2:41 AM   Result Value Ref Range    Sodium 138 135 - 145 mmol/L    Potassium 4.2 3.6 - 5.5 mmol/L    Chloride 106 96 - 112 mmol/L    Co2 25 20 - 33 mmol/L    Anion Gap 7.0 0.0 - 11.9    Glucose 113 (H) 65 - 99 mg/dL    Bun 12 8 - 22 mg/dL    Creatinine 0.77 0.50 - 1.40 mg/dL    Calcium 8.7 8.5 - 10.5 mg/dL    AST(SGOT) 288 (H) 12 - 45 U/L    ALT(SGPT) 243 (H) 2 - 50 U/L    Alkaline Phosphatase 74 30 - 99 U/L    Total Bilirubin 0.7 0.1 - 1.5 mg/dL    Albumin 3.8 3.2 - 4.9 g/dL    Total Protein 6.2 6.0 - 8.2 g/dL    Globulin 2.4 1.9 - 3.5 g/dL    A-G Ratio 1.6 g/dL   ESTIMATED GFR    Collection Time: 05/17/18  2:41 AM   Result Value Ref Range    GFR If African American >60 >60 mL/min/1.73 m 2    GFR If Non African American >60 >60 mL/min/1.73 m 2     Medical Decision Making, by Problem:  There are no active hospital problems to display for this patient.    Plan:  Pt doing okay POD #1 s/p hiatal  hernia repair. Denies nausea, vomiting. Ambulating, voiding. Tolerating some PO. Alert and oriented. NAD. Breathing unlabored. Abdomen soft, tender to incision sites. Dressings clean dry intact. VS reviewed. Labs reviewed-elevated liver enzymes. We will see how the day progresses. Pt seen and examined by Dr. Aaron.  Hopefully home later today.     Quality Measures:  Quality-Core Measures   Reviewed items::  Labs reviewed and Medications reviewed  Li catheter::  No Li  DVT prophylaxis pharmacological::  Enoxaparin (Lovenox)  DVT prophylaxis - mechanical:  SCDs  Ulcer Prophylaxis::  Yes      Discussed patient condition with Patient and Dr. Aaron

## 2018-05-17 NOTE — PROGRESS NOTES
Patient discharged home.  Educated patient on discharge instructions and gave written copy.  Patient verbalized understanding. IV removed.  Patient has all personal belongings in possession.  Patient in stable condition and fully ambulatory.  Patient walked down by nurse assistant.

## 2018-05-17 NOTE — PROGRESS NOTES
Pt Cassie admitted to room T412-1 via transport in [place] from PACU.  Pt aao,  pain reported at 5-6/10 to bilateral shoulders on a scale of 0-10. Oriented to room call light and smoking policy.  Reviewed plan of care (equipment, incentive spirometer, sequential compression devices, medications, activity, diet, fall precautions, skin care, and pain) with patient and family. Welcome packet given and reviewed with , all questions answered. Education provided on oral hygiene program.

## 2018-05-17 NOTE — CARE PLAN
Problem: Safety  Goal: Will remain free from falls  Outcome: PROGRESSING AS EXPECTED  Bed is in the lowest and locked position, patient is wearing skid proof socks, patient's call light is within reach, hourly rounding is in place

## 2018-05-18 ENCOUNTER — OFFICE VISIT (OUTPATIENT)
Dept: URGENT CARE | Facility: PHYSICIAN GROUP | Age: 53
End: 2018-05-18
Payer: COMMERCIAL

## 2018-05-18 VITALS
HEART RATE: 84 BPM | WEIGHT: 228 LBS | HEIGHT: 66 IN | OXYGEN SATURATION: 95 % | BODY MASS INDEX: 36.64 KG/M2 | DIASTOLIC BLOOD PRESSURE: 86 MMHG | SYSTOLIC BLOOD PRESSURE: 128 MMHG | TEMPERATURE: 97.3 F | RESPIRATION RATE: 16 BRPM

## 2018-05-18 DIAGNOSIS — R09.81 CONGESTION OF NASAL SINUS: ICD-10-CM

## 2018-05-18 DIAGNOSIS — R22.0 SWELLING OF FACE: ICD-10-CM

## 2018-05-18 PROCEDURE — 99213 OFFICE O/P EST LOW 20 MIN: CPT | Performed by: NURSE PRACTITIONER

## 2018-05-18 ASSESSMENT — ENCOUNTER SYMPTOMS
EYES NEGATIVE: 1
SORE THROAT: 0
GASTROINTESTINAL NEGATIVE: 1
NEUROLOGICAL NEGATIVE: 1
CONSTITUTIONAL NEGATIVE: 1
RESPIRATORY NEGATIVE: 1
CARDIOVASCULAR NEGATIVE: 1

## 2018-05-18 NOTE — PROGRESS NOTES
"Subjective:      Rosy Garay is a 53 y.o. female who presents with Facial Swelling            HPI  Patient presents complaining of facial swelling at bilateral cheeks.  Patient states that she noticed yesterday her cheeks were flushed and today her bit tender  Patient did have laparoscopic surgery yesterday for Nissen fundoplication for recurrent hiatal hernia.  Patient did have a mask on during surgery  Patient did call the surgeon to inform and was told to come to urgent care for evaluation    Patient also workup with postnasal drip this morning  Patient has a mild cough mildly productive unsure of the color of sputum  Patient is denying any fevers  Patient is denying any swelling to tongue   patient is tolerating liquid diet, with no difficulty in swallowing    Patient was not given any medication after surgery  Patient is denying any new medication products detergents  Patient does have follow-up with surgeon on 6/4/2018. Dr Aaron    Past medical history including medications and allergies reviewed  Family history and review  Social history has been reviewed      Review of Systems   Constitutional: Negative.    HENT: Positive for congestion. Negative for ear pain and sore throat.    Eyes: Negative.    Respiratory: Negative.    Cardiovascular: Negative.    Gastrointestinal: Negative.         No present abdominal pain or acid reflux   Genitourinary: Negative.    Skin: Positive for rash. Negative for itching.   Neurological: Negative.           Objective:     /86   Pulse 84   Temp 36.3 °C (97.3 °F)   Resp 16   Ht 1.676 m (5' 6\")   Wt 103.4 kg (228 lb)   SpO2 95%   BMI 36.80 kg/m²      Physical Exam   Constitutional: She is oriented to person, place, and time. She appears well-developed and well-nourished.   HENT:   Head: Normocephalic and atraumatic.   Right Ear: Hearing, tympanic membrane, external ear and ear canal normal.   Left Ear: Hearing, tympanic membrane, external ear and ear canal " normal.   Nose: Mucosal edema and rhinorrhea present. Right sinus exhibits maxillary sinus tenderness. Left sinus exhibits maxillary sinus tenderness.   Mouth/Throat: Uvula is midline, oropharynx is clear and moist and mucous membranes are normal.   Nasal voice.  Scant amount of dried yellow exudate noted  Mild maxillary tenderness   Eyes: Conjunctivae are normal. Pupils are equal, round, and reactive to light.   Neck: Normal range of motion. Neck supple.   Cardiovascular: Normal rate, regular rhythm and normal heart sounds.    Pulmonary/Chest: Effort normal and breath sounds normal.   Abdominal: Soft. Bowel sounds are normal.   Laparoscopic incisions covered with bandageof infection no drainage   Musculoskeletal: Normal range of motion.   Neurological: She is alert and oriented to person, place, and time.   Skin: Skin is warm and dry. Capillary refill takes less than 2 seconds.        Minimal erythema noted to bilateral cheeks.  Mild tenderness on evaluation  The skin feels dry  No vesicles no lesions    No erythema or similar rash noted to body       Psychiatric: She has a normal mood and affect. Her behavior is normal. Judgment and thought content normal.               Assessment/Plan:     1. Congestion of nasal sinus     2. Swelling of face       Patient's symptoms may be a combination of sinus congestion and placement of surgical mask yesterday.  Patient can start on saline rinse  Continue using Flonase  Added antihistamine such as Claritin  Discussed sinus congestion and sinus infections  Stressed adequate hydration  Monitor for worsening symptoms or follow up sooner    Regarding swelling of the face again may be related to the placement of the mask yesterday  Cool compresses  Monitor for signs of infection as discussed  If becomes irritated itchy thin layer of hydrocortisone 1% cream twice a day  Discussed strict ER precautions for actual facial swelling or tongue swelling shortness of breath or  drooling    Keep appointment with surgeon as scheduled and monitor for signs of infection to abdomen

## 2018-05-29 ENCOUNTER — HOSPITAL ENCOUNTER (OUTPATIENT)
Dept: RADIOLOGY | Facility: MEDICAL CENTER | Age: 53
End: 2018-05-29
Attending: INTERNAL MEDICINE
Payer: COMMERCIAL

## 2018-05-29 DIAGNOSIS — Z00.00 ROUTINE GENERAL MEDICAL EXAMINATION AT A HEALTH CARE FACILITY: ICD-10-CM

## 2018-05-29 PROCEDURE — 4410556 CT-CARDIAC SCORING

## 2018-06-12 ENCOUNTER — HOSPITAL ENCOUNTER (EMERGENCY)
Facility: MEDICAL CENTER | Age: 53
End: 2018-06-12
Attending: EMERGENCY MEDICINE
Payer: COMMERCIAL

## 2018-06-12 VITALS
SYSTOLIC BLOOD PRESSURE: 131 MMHG | TEMPERATURE: 97.4 F | HEART RATE: 74 BPM | BODY MASS INDEX: 37.06 KG/M2 | WEIGHT: 230.6 LBS | DIASTOLIC BLOOD PRESSURE: 77 MMHG | OXYGEN SATURATION: 96 % | RESPIRATION RATE: 16 BRPM | HEIGHT: 66 IN

## 2018-06-12 DIAGNOSIS — R10.13 EPIGASTRIC PAIN: Primary | ICD-10-CM

## 2018-06-12 LAB
ALBUMIN SERPL BCP-MCNC: 3.6 G/DL (ref 3.2–4.9)
ALBUMIN/GLOB SERPL: 1.2 G/DL
ALP SERPL-CCNC: 50 U/L (ref 30–99)
ALT SERPL-CCNC: 22 U/L (ref 2–50)
ANION GAP SERPL CALC-SCNC: 7 MMOL/L (ref 0–11.9)
AST SERPL-CCNC: 32 U/L (ref 12–45)
BASOPHILS # BLD AUTO: 0.8 % (ref 0–1.8)
BASOPHILS # BLD: 0.05 K/UL (ref 0–0.12)
BILIRUB SERPL-MCNC: 0.7 MG/DL (ref 0.1–1.5)
BUN SERPL-MCNC: 13 MG/DL (ref 8–22)
CALCIUM SERPL-MCNC: 9.3 MG/DL (ref 8.4–10.2)
CHLORIDE SERPL-SCNC: 106 MMOL/L (ref 96–112)
CO2 SERPL-SCNC: 25 MMOL/L (ref 20–33)
CREAT SERPL-MCNC: 0.8 MG/DL (ref 0.5–1.4)
EKG IMPRESSION: NORMAL
EOSINOPHIL # BLD AUTO: 0.98 K/UL (ref 0–0.51)
EOSINOPHIL NFR BLD: 15.1 % (ref 0–6.9)
ERYTHROCYTE [DISTWIDTH] IN BLOOD BY AUTOMATED COUNT: 38.5 FL (ref 35.9–50)
GLOBULIN SER CALC-MCNC: 2.9 G/DL (ref 1.9–3.5)
GLUCOSE SERPL-MCNC: 106 MG/DL (ref 65–99)
HCT VFR BLD AUTO: 40.8 % (ref 37–47)
HGB BLD-MCNC: 14.1 G/DL (ref 12–16)
IMM GRANULOCYTES # BLD AUTO: 0.02 K/UL (ref 0–0.11)
IMM GRANULOCYTES NFR BLD AUTO: 0.3 % (ref 0–0.9)
LIPASE SERPL-CCNC: 31 U/L (ref 7–58)
LYMPHOCYTES # BLD AUTO: 1.92 K/UL (ref 1–4.8)
LYMPHOCYTES NFR BLD: 29.6 % (ref 22–41)
MCH RBC QN AUTO: 30.4 PG (ref 27–33)
MCHC RBC AUTO-ENTMCNC: 34.6 G/DL (ref 33.6–35)
MCV RBC AUTO: 87.9 FL (ref 81.4–97.8)
MONOCYTES # BLD AUTO: 0.47 K/UL (ref 0–0.85)
MONOCYTES NFR BLD AUTO: 7.3 % (ref 0–13.4)
NEUTROPHILS # BLD AUTO: 3.04 K/UL (ref 2–7.15)
NEUTROPHILS NFR BLD: 46.9 % (ref 44–72)
NRBC # BLD AUTO: 0 K/UL
NRBC BLD-RTO: 0 /100 WBC
PLATELET # BLD AUTO: 258 K/UL (ref 164–446)
PMV BLD AUTO: 10.4 FL (ref 9–12.9)
POTASSIUM SERPL-SCNC: 3.7 MMOL/L (ref 3.6–5.5)
PROT SERPL-MCNC: 6.5 G/DL (ref 6–8.2)
RBC # BLD AUTO: 4.64 M/UL (ref 4.2–5.4)
SODIUM SERPL-SCNC: 138 MMOL/L (ref 135–145)
WBC # BLD AUTO: 6.5 K/UL (ref 4.8–10.8)

## 2018-06-12 PROCEDURE — 80053 COMPREHEN METABOLIC PANEL: CPT

## 2018-06-12 PROCEDURE — 93005 ELECTROCARDIOGRAM TRACING: CPT

## 2018-06-12 PROCEDURE — 99284 EMERGENCY DEPT VISIT MOD MDM: CPT

## 2018-06-12 PROCEDURE — 83690 ASSAY OF LIPASE: CPT

## 2018-06-12 PROCEDURE — 85025 COMPLETE CBC W/AUTO DIFF WBC: CPT

## 2018-06-12 PROCEDURE — 36415 COLL VENOUS BLD VENIPUNCTURE: CPT

## 2018-06-12 RX ORDER — ACETAMINOPHEN 500 MG
1500 TABLET ORAL EVERY 6 HOURS PRN
Status: SHIPPED | COMMUNITY
End: 2018-07-25

## 2018-06-12 NOTE — DISCHARGE INSTRUCTIONS
Abdominal Pain, Adult  Abdominal pain can be caused by many things. Often, abdominal pain is not serious and it gets better with no treatment or by being treated at home. However, sometimes abdominal pain is serious. Your health care provider will do a medical history and a physical exam to try to determine the cause of your abdominal pain.  Follow these instructions at home:  · Take over-the-counter and prescription medicines only as told by your health care provider. Do not take a laxative unless told by your health care provider.  · Drink enough fluid to keep your urine clear or pale yellow.  · Watch your condition for any changes.  · Keep all follow-up visits as told by your health care provider. This is important.  Contact a health care provider if:  · Your abdominal pain changes or gets worse.  · You are not hungry or you lose weight without trying.  · You are constipated or have diarrhea for more than 2-3 days.  · You have pain when you urinate or have a bowel movement.  · Your abdominal pain wakes you up at night.  · Your pain gets worse with meals, after eating, or with certain foods.  · You are throwing up and cannot keep anything down.  · You have a fever.  Get help right away if:  · Your pain does not go away as soon as your health care provider told you to expect.  · You cannot stop throwing up.  · Your pain is only in areas of the abdomen, such as the right side or the left lower portion of the abdomen.  · You have bloody or black stools, or stools that look like tar.  · You have severe pain, cramping, or bloating in your abdomen.  · You have signs of dehydration, such as:  ¨ Dark urine, very little urine, or no urine.  ¨ Cracked lips.  ¨ Dry mouth.  ¨ Sunken eyes.  ¨ Sleepiness.  ¨ Weakness.  This information is not intended to replace advice given to you by your health care provider. Make sure you discuss any questions you have with your health care provider.  Document Released: 09/27/2006 Document  Revised: 07/07/2017 Document Reviewed: 05/31/2017  [employer] Interactive Patient Education © 2017 [employer]    Food Choices for Gastroesophageal Reflux Disease, Adult  When you have gastroesophageal reflux disease (GERD), the foods you eat and your eating habits are very important. Choosing the right foods can help ease your discomfort.   WHAT GUIDELINES DO I NEED TO FOLLOW?   · Choose fruits, vegetables, whole grains, and low-fat dairy products.    · Choose low-fat meat, fish, and poultry.  · Limit fats such as oils, salad dressings, butter, nuts, and avocado.    · Keep a food diary. This helps you identify foods that cause symptoms.    · Avoid foods that cause symptoms. These may be different for everyone.    · Eat small meals often instead of 3 large meals a day.    · Eat your meals slowly, in a place where you are relaxed.    · Limit fried foods.    · Cook foods using methods other than frying.    · Avoid drinking alcohol.    · Avoid drinking large amounts of liquids with your meals.    · Avoid bending over or lying down until 2-3 hours after eating.    WHAT FOODS ARE NOT RECOMMENDED?   These are some foods and drinks that may make your symptoms worse:  Vegetables  Tomatoes. Tomato juice. Tomato and spaghetti sauce. Chili peppers. Onion and garlic. Horseradish.  Fruits  Oranges, grapefruit, and lemon (fruit and juice).  Meats  High-fat meats, fish, and poultry. This includes hot dogs, ribs, ham, sausage, salami, and mendieta.  Dairy  Whole milk and chocolate milk. Sour cream. Cream. Butter. Ice cream. Cream cheese.   Drinks  Coffee and tea. Bubbly (carbonated) drinks or energy drinks.  Condiments  Hot sauce. Barbecue sauce.   Sweets/Desserts  Chocolate and cocoa. Donuts. Peppermint and spearmint.  Fats and Oils  High-fat foods. This includes French fries and potato chips.  Other  Vinegar. Strong spices. This includes black pepper, white pepper, red pepper, cayenne, vargas powder, cloves, ginger, and chili  powder.  The items listed above may not be a complete list of foods and drinks to avoid. Contact your dietitian for more information.     This information is not intended to replace advice given to you by your health care provider. Make sure you discuss any questions you have with your health care provider.     Document Released: 06/18/2013 Document Revised: 01/08/2016 Document Reviewed: 10/22/2014  [employer] Interactive Patient Education ©2016 [employer]

## 2018-06-12 NOTE — ED NOTES
Iv dc'd and pt released with  and all follow-up. Pt to call Dr. Aaron for follow-up in 2 days. Stable to POV.

## 2018-06-12 NOTE — ED NOTES
Med rec updated and complete  Allergies reviewed  Interviewed pt with  at bedside with permission from pt.  Pt reports no antibiotics in the last 30 days.

## 2018-06-12 NOTE — ED PROVIDER NOTES
ED Provider Note    CHIEF COMPLAINT  Chief Complaint   Patient presents with   • Abdominal Pain     May 16th this pt had a surgery for a hernia, post gastric bypass and gerd. Today she experienced the same pain though worse, stabbing and severe in nature. This pain also radiated to both side of her abd. No N/V/D. No chest pain       HPI  Rosy Garay is a 53 y.o. female who presents epigastric pain.  Patient states that she had a hiatal hernia repair about 1 month ago.  Years past she had a gastric bypass surgery and hiatal hernia done but now Dr. Talley redid the hiatal hernia.  She had severe reflux prior to that.  About 1 week ago she had reflux again.  And then this morning she woke up and had a half a cup of coffee and about an hour later developed severe pain epigastric area that did not seem to almost be gone now.  No vomiting or diarrhea    REVIEW OF SYSTEMS  CONSTITUTIONAL:  Denies fever, chills  EYES:  Denies  discharge   ENT:  Denies sore throat, nose or ear pain  CARDIOVASCULAR:  Denies chest pain, palpitations or swelling  RESPIRATORY:  Denies cough or shortness of breath or difficulty breathing  GI: Positive epigastric pain 10/10 now down almost gone.  No radiation  MUSCULOSKELETAL:  Denies weakness joint swelling or back pain  SKIN:  No rash  ALLERGIC: No itchy rashes.  NEUROLOGIC:  Denies headache.  Had a headache this morning about 2 in the morning took Tylenol resolved.  No, focal weakness or numbness  PSYCHIATRIC:  Denies depression      PAST MEDICAL HISTORY  Past Medical History:   Diagnosis Date   • Allergy    • Anxiety    • ASTHMA     worsening and been in hospital   • Breast calcification seen on mammogram     left breast   • Depression     seasonal-untreated   • Eczema 8/17/2009   • Heart burn    • Hiatal hernia 8/17/2009   • Hypothyroid 8/17/2009   • Menopause 8/17/2009   • Pain     wrist, lower back, shoulders   • Tinnitus    • Ulcer     gastric ulcer on gastric bypass in incision        FAMILY HISTORY  Family History   Problem Relation Age of Onset   • Heart Disease Mother      MI   • Hyperlipidemia Mother    • Heart Disease Father      MI x3, widowmaker   • Lung Disease Father      COPD   • Hyperlipidemia Father    • Heart Disease Maternal Grandmother      MI double bypass   • Cancer Maternal Grandmother      breast   • Cancer Maternal Grandfather      colon and bile duct-diagnosed in 60's   • Heart Disease Paternal Grandmother      MI       SOCIAL HISTORY   reports that she quit smoking about 12 years ago. Her smoking use included Cigarettes. She quit after 10.00 years of use. She has never used smokeless tobacco. She reports that she does not drink alcohol or use drugs.    SURGICAL HISTORY  Past Surgical History:   Procedure Laterality Date   • NISSEN FUNDOPLICATION LAPAROSCOPIC  5/16/2018    Procedure: NISSEN FUNDOPLICATION LAPAROSCOPIC- FOR RECURRENT HIATAL HERNIA;  Surgeon: Jamaal Aaron M.D.;  Location: SURGERY [facility];  Service: General   • OTHER ABDOMINAL SURGERY  2007    revision of gastric pouch   • HERNIA REPAIR  2005    hiatal   • GASTRIC BYPASS LAPAROSCOPIC  2004   • TUBAL COAGULATION LAPAROSCOPIC BILATERAL  1990   • BREAST BIOPSY      microcalcification   • BREAST BIOPSY      left benign breast biopsy in 1994   • CHOLECYSTECTOMY  1993 or 1994    laparoscopic       CURRENT MEDICATIONS  No current facility-administered medications for this encounter.     Current Outpatient Prescriptions:   •  clotrimazole-betamethasone (LOTRISONE) 1-0.05 % Cream, Apply 1 Application to affected area(s) 2 times a day as needed. Eczema - 3 weeks on, 1 week off, Disp: , Rfl:   •  VITAMIN D, ERGOCALCIFEROL, PO, Take 1 Tab by mouth every morning., Disp: , Rfl:   •  estradiol (ESTRACE) 1 MG Tab, Take 1 mg by mouth every bedtime., Disp: , Rfl:   •  liothyronine (CYTOMEL) 25 MCG Tab, Take 25 mcg by mouth every morning., Disp: , Rfl:   •  omeprazole (PRILOSEC) 20 MG delayed-release capsule, Take  "20 mg by mouth 2 times a day., Disp: , Rfl:   •  progesterone (PROMETRIUM) 100 MG Cap, Take 100 mg by mouth every bedtime., Disp: , Rfl:   •  Calcium 250 MG Cap, Take 2 Tabs by mouth every bedtime., Disp: , Rfl:   •  Magnesium 250 MG Tab, Take 1 Tab by mouth every bedtime., Disp: , Rfl:   •  levothyroxine (SYNTHROID) 200 MCG Tab, Take 1 Tab by mouth every morning. ON A EMPTY STOMACH, Disp: 90 Tab, Rfl: 2  •  sucralfate (CARAFATE) 1 GM Tab, Take 1 g by mouth 4 Times a Day,Before Meals and at Bedtime., Disp: , Rfl:   •  ADVAIR DISKUS 500-50 MCG/DOSE AEPB, USE 1 INHALATION ORALLY    TWICE DAILY, Disp: 1 Each, Rfl: 3      ALLERGIES  Allergies   Allergen Reactions   • Nkda [No Known Drug Allergy]        PHYSICAL EXAM  VITAL SIGNS: /77   Pulse 78   Temp 36.3 °C (97.4 °F)   Resp 16   Ht 1.664 m (5' 5.5\")   Wt 104.6 kg (230 lb 9.6 oz)   SpO2 97%   BMI 37.79 kg/m²      Constitutional: Patient is awake alert person place and time. No acute respiratory distress Well developed, Well nourished, Non-toxic appearance.   HENT: Normocephalic, Atraumatic,  Bilateral external ears normal, Oropharynx pink moist with no exudates, Nose patent.   Eyes: , Sclera and conjunctiva clear, No discharge.   Neck:   Non-tender  Lymphatic: No supraclavicular,   Cardiovascular: Heart is regular rate and rhythm no murmur, rub or thrill.   Thorax & Lungs: Chest is symmetrical, with good breath sounds. No wheezing or crackles. No respiratory distress  Abdomen:  Soft, very minimal epigastric pain.  No guarding or rebound.  Bowel sounds present.  Skin: Warm, Dry, no petechia, purpura or rash.   Back: Non tender with palpation, No CVA tenderness.   Extremities: No  edema.  Non tender.   Musculoskeletal: Good range of motion upper and lower extremities.    Neurologic: Alert & oriented   Strength is symmetrical in the upper lower extremities.    Psychiatric: Cooperative friendly.    EKG:    LABS:  Lab Results   Component Value Date    WBC 6.5 " 06/12/2018    WBC 6.3 08/22/2009    HEMOGLOBIN 14.1 06/12/2018    HEMATOCRIT 40.8 06/12/2018    PLATELETCT 258 06/12/2018    SODIUM 138 06/12/2018    POTASSIUM 3.7 06/12/2018    CHLORIDE 106 06/12/2018    CO2 25 06/12/2018    BUN 13 06/12/2018    GLUCOSE 106 (H) 06/12/2018    CHOLSTRLTOT 149 03/27/2018    LDLCHOL 119 05/24/2014    LDL 94 03/27/2018    ALTSGPT 22 06/12/2018    ASTSGOT 32 06/12/2018    TSH 0.920 08/22/2009    HBA1C 5.0 03/27/2018           COURSE & MEDICAL DECISION MAKING  Pertinent Labs & Imaging studies reviewed. (See chart for details)  Differential diagnosis includes but not limited to bowel obstruction surgical repair failure hiatal hernia peptic ulcer disease intussusception, volvulus, appendicitis, gallbladder disease, enteritis, ulcers    Patient states she has sudden onset of severe pain this morning that has pretty much resolved.  She has had some reflux recently in the last week similar to before her surgery.  I discussed the case with Dr. Jamaal Talley.  At this time we felt of her lab work was normal and her pain was pretty much resolved that he would like to see her in follow-up on Thursday.  We discussed whether or not to do a CAT scan but at this time since her pain is pretty much resolved do not feel it is necessary to give her this dose of radiation currently.  She understands this.  At this time both her and her  like to see Dr. Talley on Thursday for follow-up without a CAT scan today.  They understand that there could be some other abnormalities but at this time I think to be less likely that is an acute surgical lesion since she really has very little pain at this time and her lab work is normal.  Although she understands her gets worse she will need to come back immediately and possibly have this CAT scan done.      10:30 AM: Recheck.  Feeling much better.    She will be discharged in stable condition for close follow-up with Dr. Talley as an outpatient    FINAL IMPRESSION  1.   Abdominal pain resolved  2.  Recent surgery       PLAN  1.  Soft bland diet  2.  Follow-up Dr. Talley 2 days  Abdominal pain information sheet  3. Return to the emergency department for increased pains, fevers, vomiting or change in condition.  Electronically signed by: Freddy Wall, 6/12/2018 9:01 AM

## 2018-06-12 NOTE — ED NOTES
Chief Complaint   Patient presents with   • Abdominal Pain     May 16th this pt had a surgery for a hernia, post gastric bypass and gerd. Today she experienced the same pain though worse, stabbing and severe in nature. This pain also radiated to both side of her abd. No N/V/D. No chest pain

## 2018-06-13 ENCOUNTER — PATIENT OUTREACH (OUTPATIENT)
Dept: HEALTH INFORMATION MANAGEMENT | Facility: OTHER | Age: 53
End: 2018-06-13

## 2018-06-18 ENCOUNTER — HOSPITAL ENCOUNTER (OUTPATIENT)
Dept: RADIOLOGY | Facility: MEDICAL CENTER | Age: 53
End: 2018-06-18
Attending: COLON & RECTAL SURGERY
Payer: COMMERCIAL

## 2018-06-18 DIAGNOSIS — K21.9 GASTROESOPHAGEAL REFLUX DISEASE, ESOPHAGITIS PRESENCE NOT SPECIFIED: ICD-10-CM

## 2018-06-18 PROCEDURE — A9541 TC99M SULFUR COLLOID: HCPCS

## 2018-07-02 ENCOUNTER — HOSPITAL ENCOUNTER (OUTPATIENT)
Dept: RADIOLOGY | Facility: MEDICAL CENTER | Age: 53
End: 2018-07-02
Attending: COLON & RECTAL SURGERY
Payer: COMMERCIAL

## 2018-07-02 DIAGNOSIS — K21.9 GASTROESOPHAGEAL REFLUX DISEASE, ESOPHAGITIS PRESENCE NOT SPECIFIED: ICD-10-CM

## 2018-07-02 PROCEDURE — 74241 DX-UPPER GI-SERIES WITH KUB: CPT

## 2018-07-25 ENCOUNTER — APPOINTMENT (OUTPATIENT)
Dept: ADMISSIONS | Facility: MEDICAL CENTER | Age: 53
End: 2018-07-25
Attending: COLON & RECTAL SURGERY
Payer: COMMERCIAL

## 2018-08-01 ENCOUNTER — APPOINTMENT (OUTPATIENT)
Dept: RADIOLOGY | Facility: MEDICAL CENTER | Age: 53
End: 2018-08-01
Attending: COLON & RECTAL SURGERY
Payer: COMMERCIAL

## 2018-08-01 ENCOUNTER — HOSPITAL ENCOUNTER (OUTPATIENT)
Facility: MEDICAL CENTER | Age: 53
End: 2018-08-01
Attending: COLON & RECTAL SURGERY | Admitting: COLON & RECTAL SURGERY
Payer: COMMERCIAL

## 2018-08-01 VITALS
RESPIRATION RATE: 14 BRPM | OXYGEN SATURATION: 98 % | HEART RATE: 56 BPM | TEMPERATURE: 97.9 F | BODY MASS INDEX: 37.63 KG/M2 | HEIGHT: 66 IN | SYSTOLIC BLOOD PRESSURE: 115 MMHG | DIASTOLIC BLOOD PRESSURE: 73 MMHG | WEIGHT: 234.13 LBS

## 2018-08-01 DIAGNOSIS — G89.18 POST-OP PAIN: ICD-10-CM

## 2018-08-01 PROCEDURE — 700111 HCHG RX REV CODE 636 W/ 250 OVERRIDE (IP)

## 2018-08-01 PROCEDURE — 160009 HCHG ANES TIME/MIN: Performed by: COLON & RECTAL SURGERY

## 2018-08-01 PROCEDURE — 700101 HCHG RX REV CODE 250

## 2018-08-01 PROCEDURE — 88305 TISSUE EXAM BY PATHOLOGIST: CPT

## 2018-08-01 PROCEDURE — 160203 HCHG ENDO MINUTES - 1ST 30 MINS LEVEL 4: Performed by: COLON & RECTAL SURGERY

## 2018-08-01 PROCEDURE — 160048 HCHG OR STATISTICAL LEVEL 1-5: Performed by: COLON & RECTAL SURGERY

## 2018-08-01 PROCEDURE — 160035 HCHG PACU - 1ST 60 MINS PHASE I: Performed by: COLON & RECTAL SURGERY

## 2018-08-01 PROCEDURE — 160025 RECOVERY II MINUTES (STATS): Performed by: COLON & RECTAL SURGERY

## 2018-08-01 PROCEDURE — 160002 HCHG RECOVERY MINUTES (STAT): Performed by: COLON & RECTAL SURGERY

## 2018-08-01 PROCEDURE — C1726 CATH, BAL DIL, NON-VASCULAR: HCPCS | Performed by: COLON & RECTAL SURGERY

## 2018-08-01 PROCEDURE — 500066 HCHG BITE BLOCK, ECT: Performed by: COLON & RECTAL SURGERY

## 2018-08-01 PROCEDURE — 160046 HCHG PACU - 1ST 60 MINS PHASE II: Performed by: COLON & RECTAL SURGERY

## 2018-08-01 RX ORDER — SODIUM CHLORIDE, SODIUM LACTATE, POTASSIUM CHLORIDE, CALCIUM CHLORIDE 600; 310; 30; 20 MG/100ML; MG/100ML; MG/100ML; MG/100ML
INJECTION, SOLUTION INTRAVENOUS CONTINUOUS
Status: DISCONTINUED | OUTPATIENT
Start: 2018-08-01 | End: 2018-08-01 | Stop reason: HOSPADM

## 2018-08-01 RX ORDER — TRAMADOL HYDROCHLORIDE 50 MG/1
50 TABLET ORAL
Status: ON HOLD | COMMUNITY
End: 2018-08-01

## 2018-08-01 RX ORDER — ACETAMINOPHEN 500 MG
1000 TABLET ORAL
COMMUNITY

## 2018-08-01 RX ORDER — TRAMADOL HYDROCHLORIDE 50 MG/1
50 TABLET ORAL
Qty: 30 TAB | Refills: 0 | Status: SHIPPED | OUTPATIENT
Start: 2018-08-01 | End: 2018-08-31

## 2018-08-01 RX ORDER — LIDOCAINE HYDROCHLORIDE 10 MG/ML
INJECTION, SOLUTION INFILTRATION; PERINEURAL
Status: COMPLETED
Start: 2018-08-01 | End: 2018-08-01

## 2018-08-01 RX ORDER — SUMATRIPTAN 25 MG/1
25 TABLET, FILM COATED ORAL
COMMUNITY

## 2018-08-01 RX ORDER — LIDOCAINE HYDROCHLORIDE 10 MG/ML
0.5 INJECTION, SOLUTION INFILTRATION; PERINEURAL
Status: DISCONTINUED | OUTPATIENT
Start: 2018-08-01 | End: 2018-08-01 | Stop reason: HOSPADM

## 2018-08-01 RX ADMIN — LIDOCAINE HYDROCHLORIDE 0.5 ML: 10 INJECTION, SOLUTION INFILTRATION; PERINEURAL at 07:25

## 2018-08-01 RX ADMIN — SODIUM CHLORIDE, SODIUM LACTATE, POTASSIUM CHLORIDE, CALCIUM CHLORIDE: 600; 310; 30; 20 INJECTION, SOLUTION INTRAVENOUS at 07:25

## 2018-08-01 ASSESSMENT — PAIN SCALES - GENERAL
PAINLEVEL_OUTOF10: 0
PAINLEVEL_OUTOF10: 0
PAINLEVEL_OUTOF10: 2
PAINLEVEL_OUTOF10: 0

## 2018-08-01 NOTE — OP REPORT
NAME:  Rosy Garay  MRN:  [id number]  :  1965      DATE OF OPERATION: 2018    PREOPERATIVE DIAGNOSIS: GERD, abdominal pain, Dyspepsia; Delayed gastric emptying; history of RYGB and remote history of ulcers    POSTOPERATIVE DIAGNOSIS: Short-segment esophagitis suspicious for Islas's esophagus, no evidence of hiatal hernia, very mild stenosis at gastrojejunal anstamosis    OPERATION PERFORMED: Esophagogastroduodenoscopy with Achalasia balloon dilatation; biopsies of distal esophagus    ANESTHESIA: Nik Butcher MD., MD     SURGEON: Jamaal Aaron MD    SPECIMEN: distal esophagus    COMPLICATIONS: None    ESTIMATED BLOOD LOSS: <5 cc    INDICATIONS: The patient is a 53 y.o. female with a history of GERD, episodic severe crampy abdominal pain, Dyspepsia; Delayed gastric emptying; history of RYGB and remote history of ulcers. She is taken to the operating room today for Esophagogastroduodenoscopy and dilatation for suspected stenosis.     DETAILS OF PROCEDURE: After an extensive informed consent discussion and   process, the patient was brought to the operating room and was placed in a   supine position on the endoscopy table. The patient was then given general anesthesia and endotracheal tube intubation. During the course of the procedure, the patient was monitored continuously with pulse oximetry, telemetry, and intermittent blood pressure readings.     After placement of the oral bite block to protect the teeth, gums, and gastroscope, the gastroscope was slowly introduced and advanced into the esophagus. It was slowly advanced down to the gastroesophageal junction region. The GE junction demonstrated findings suspicious for short segment Islas's esophagus. The gastroscope passed without difficulty into the pouch of the stomach, which appeared normal and consistent with gastric bypass RYGB anatomy, without ulceration or gastritis.     A very minimal stenosis was present at the anastamosis. The  gastroscope was then advanced into the jejunem without difficulty. The jejunem appeared normal with no evidence of inflammation, obstruction or ulcers.  The guidewire was advanced into the duodenum and the 30mm pneumatic balloon was selected and the wire-guided system was advanced down to straddle the stenosis. The stenosis was then successfully dilated to 30mm, for 3 minutes each at two different stations . After the balloon was deflated and withdrawn, the diagnostic endoscope was introduced and the area was inspected and showed trace blood and no evidence of untoward events. The stenosis appeared successfully dilated.     The gastroscope was slowly withdrawn as air was aspirated and the area of the proximal pouch and gastroesophageal junction region were again carefully inspected, which demonstrated no evidence of hiatal hernia. The gastroesophageal junction was carefully inspected as was the esophagus as we withdrew the gastroscope and biopsies of the distal esophagus were obtained and sent for pathology. The gastroscope was then withdrawn.    IMPRESSION/PLAN: Short-segment esophagitis suspicious for Islas's esophagus, No evidence of hiatal hernia, very mild stenosis at gastrojejunal anstamosis dilated  Continue PPI.    The patient tolerated the procedure well and there were no apparent complications.  She was awakened and transferred to the recovery area in satisfactory condition.       ____________________________________   Jamaal Aaron MD  DD: 8/1/2018  9:12 AM    CC:  [facility];

## 2018-08-01 NOTE — OR NURSING
Pt discharged home with  via  escort to car.  Discharge instructions and RX given to pt/.  Pt and  verbalized understanding. Hay pain or nausea at time of discharge. No s/s of distress noted.

## 2018-08-01 NOTE — DISCHARGE INSTRUCTIONS
ACTIVITY: Rest and take it easy for the first 24 hours.  A responsible adult is recommended to remain with you during that time.  It is normal to feel sleepy.  We encourage you to not do anything that requires balance, judgment or coordination.    MILD FLU-LIKE SYMPTOMS ARE NORMAL. YOU MAY EXPERIENCE GENERALIZED MUSCLE ACHES, THROAT IRRITATION, HEADACHE AND/OR SOME NAUSEA.    FOR 24 HOURS DO NOT:  Drive, operate machinery or run household appliances.  Drink beer or alcoholic beverages.   Make important decisions or sign legal documents.    SPECIAL INSTRUCTIONS: EGD Dilation D/C instructions:    1. DIET: Clear liquids and transition to shakes and smoothies.  Progressing as instructed will make for a smooth transition after surgery and prevent any pain associated with eating foods before your stomach is ready or eating too much.  Water and hydration is much more important than food intake the first week or two after surgery.  Drink enough water to keep your urine pale yellow.  Increase water intake if your urine is a darker yellow or if have burning with urination.  Nausea and vomiting once or twice after you leave the hospital is normal.  Sip fluids continually and stay hydrated.    2.  SUPPLEMENTS: Start your supplements day after surgery.  You may need to cut some supplements in half initially.  Follow the guidelines from your supplement handout from your pre-op class.     3. ACTIVITIES: After discharge from the hospital, you may resume full routine activities. However, there should be no heavy lifting (greater than 15 pounds) and no strenuous activities until after your follow-up visit. Otherwise, routine activities of daily living are acceptable.  More movement and walking after surgery the better.    4. DRIVING: You may drive whenever you are off pain medications and are able to perform the activities needed to drive, i.e. turning, bending, twisting, etc.    5. BOWEL FUNCTION: Constipation is common after an  operation, especially with pain medications. The combination of pain medication and decreased activity level can cause constipation in otherwise normal patients. If you feel this is occurring, take a laxative (Milk of Magnesia, Miralax, etc.) until the problem has resolved.  Diarrhea the first few days after surgery can also be normal.  You may notice that it is dark in color or see blood, which is also normal and should subside in the first week post-op.    6. PAIN MEDICATION: For minimal discomfort you may use liquid Tylenol 650 mg every 4 hours.  DO NOT exceed 4,000 mg of Tylenol in 24 hours.  DO NOT use non steroidal anti-inflamatory medication such as: Asprin, Ibuprofen, Advil, Motrin or Aleve.  These medication can cause ulcers after weight loss surgery.    7.CALL IF YOU HAVE: (1) Fevers to more than 101.50 F, (2) leg pain or swelling (3) Persistent Nausea or Vomiting for over 24 hours.  Use your anti nausea medication as prescribed. (4) Call 911 if sudden onset of chest pain or shortness of breath that does not improve with 5-10 min of rest.    8. APPOINTMENT: Contact our office at [phone] for follow up in 4 weeks.  Our office hours are Monday-Friday, 8am-5pm.  Please try to call during these hours when we are better able to assist you.    If you have any additional questions, please do not hesitate to call the office and speak to either myself or the physician on call.    Office address:  [facility].  [address] [place] [id number]        You should call 911 if you develop problems with breathing or chest pain.  If you are unable to contact your doctor or surgical center, you should go to the nearest emergency room or urgent care center.      If any questions arise, call your doctor.  If your doctor is not available, please feel free to call the Surgical Center at [phone].  The Center is open Monday through Friday from 7AM to 7PM.  You can also call the HEALTH  HOTLINE open 24 hours/day, 7 days/week and speak to a nurse at [phone], or toll free at [phone].    A registered nurse may call you a few days after your surgery to see how you are doing after your procedure.    MEDICATIONS: Resume taking daily medication.  Take prescribed pain medication with food.  If no medication is prescribed, you may take non-aspirin pain medication if needed.  PAIN MEDICATION CAN BE VERY CONSTIPATING.  Take a stool softener or laxative such as senokot, pericolace, or milk of magnesia if needed.    Prescription given for ultram.      If your physician has prescribed pain medication that includes Acetaminophen (Tylenol), do not take additional Acetaminophen (Tylenol) while taking the prescribed medication.    Depression / Suicide Risk    As you are discharged from this [facility] facility, it is important to learn how to keep safe from harming yourself.    Recognize the warning signs:  · Abrupt changes in personality, positive or negative- including increase in energy   · Giving away possessions  · Change in eating patterns- significant weight changes-  positive or negative  · Change in sleeping patterns- unable to sleep or sleeping all the time   · Unwillingness or inability to communicate  · Depression  · Unusual sadness, discouragement and loneliness  · Talk of wanting to die  · Neglect of personal appearance   · Rebelliousness- reckless behavior  · Withdrawal from people/activities they love  · Confusion- inability to concentrate     If you or a loved one observes any of these behaviors or has concerns about self-harm, here's what you can do:  · Talk about it- your feelings and reasons for harming yourself  · Remove any means that you might use to hurt yourself (examples: pills, rope, extension cords, firearm)  · Get professional help from the community (Mental Health, Substance Abuse, psychological counseling)  · Do not be alone:Call your Safe Contact- someone whom you  trust who will be there for you.  · Call your local CRISIS HOTLINE 762-2734 or 769-772-6316  · Call your local Children's Mobile Crisis Response Team [place] (415) 992-3740 or www.[employer]  · Call the toll free National Suicide Prevention Hotlines   · National Suicide Prevention Lifeline 896-623-BOFL (7070)  · National femeninas Line Network 800-SUICIDE (191-6391)

## 2018-08-16 ENCOUNTER — NON-PROVIDER VISIT (OUTPATIENT)
Dept: URGENT CARE | Facility: PHYSICIAN GROUP | Age: 53
End: 2018-08-16

## 2018-08-16 DIAGNOSIS — Z02.1 PRE-EMPLOYMENT DRUG SCREENING: ICD-10-CM

## 2018-08-16 LAB
AMP AMPHETAMINE: NORMAL
COC COCAINE: NORMAL
INT CON NEG: NORMAL
INT CON POS: NORMAL
MET METHAMPHETAMINES: NORMAL
OPI OPIATES: NORMAL
PCP PHENCYCLIDINE: NORMAL
POC DRUG COMMENT 753798-OCCUPATIONAL HEALTH: NORMAL
THC: NORMAL

## 2018-08-16 PROCEDURE — 80305 DRUG TEST PRSMV DIR OPT OBS: CPT | Performed by: NURSE PRACTITIONER

## 2018-12-19 ENCOUNTER — OFFICE VISIT (OUTPATIENT)
Dept: URGENT CARE | Facility: PHYSICIAN GROUP | Age: 53
End: 2018-12-19
Payer: COMMERCIAL

## 2018-12-19 VITALS
HEART RATE: 67 BPM | SYSTOLIC BLOOD PRESSURE: 122 MMHG | BODY MASS INDEX: 37.61 KG/M2 | RESPIRATION RATE: 16 BRPM | DIASTOLIC BLOOD PRESSURE: 80 MMHG | OXYGEN SATURATION: 97 % | WEIGHT: 234 LBS | TEMPERATURE: 97.6 F | HEIGHT: 66 IN

## 2018-12-19 DIAGNOSIS — S16.1XXA CERVICAL STRAIN, ACUTE, INITIAL ENCOUNTER: ICD-10-CM

## 2018-12-19 PROCEDURE — 99214 OFFICE O/P EST MOD 30 MIN: CPT | Performed by: PHYSICIAN ASSISTANT

## 2018-12-19 RX ORDER — CYCLOBENZAPRINE HCL 5 MG
5-10 TABLET ORAL 3 TIMES DAILY PRN
Qty: 30 TAB | Refills: 0 | Status: SHIPPED | OUTPATIENT
Start: 2018-12-19 | End: 2019-04-01

## 2018-12-19 RX ORDER — METHYLPREDNISOLONE 4 MG/1
TABLET ORAL
Qty: 1 KIT | Refills: 0 | Status: SHIPPED | OUTPATIENT
Start: 2018-12-19 | End: 2019-04-01

## 2018-12-19 ASSESSMENT — ENCOUNTER SYMPTOMS
VOMITING: 0
NAUSEA: 0
NECK PAIN: 1
SHORTNESS OF BREATH: 0
CHILLS: 0
PALPITATIONS: 0
SORE THROAT: 0
ABDOMINAL PAIN: 0
BLURRED VISION: 0
TROUBLE SWALLOWING: 0
HEADACHES: 1
SENSORY CHANGE: 0
PHOTOPHOBIA: 0
TINGLING: 0
WEAKNESS: 0
VISUAL CHANGE: 0
COUGH: 0
FEVER: 0
WEIGHT LOSS: 0

## 2018-12-19 NOTE — PROGRESS NOTES
Subjective:      Rosy Garay is a 53 y.o. female who presents with Neck Pain (x 3 days after sleeping wrong ) and Headache      Neck Pain    This is a new problem. The current episode started in the past 7 days (Pain started 3 days ago.  She first noticed it after she woke up). The problem occurs constantly. The problem has been gradually worsening. The pain is associated with a sleep position. The pain is present in the left side. The quality of the pain is described as aching and cramping. The pain is moderate. The symptoms are aggravated by bending, position and twisting. The pain is same all the time. Associated symptoms include headaches. Pertinent negatives include no chest pain, fever, pain with swallowing, photophobia, tingling, trouble swallowing, visual change, weakness or weight loss. She has tried heat and acetaminophen (Biofreeze) for the symptoms. The treatment provided mild (Patient reports very mild relief for a limited period of time while using the heat and the Biofreeze) relief.       Review of Systems   Constitutional: Negative for chills, fever and weight loss.   HENT: Negative for congestion, sore throat and trouble swallowing.    Eyes: Negative for blurred vision and photophobia.   Respiratory: Negative for cough and shortness of breath.    Cardiovascular: Negative for chest pain and palpitations.   Gastrointestinal: Negative for abdominal pain, nausea and vomiting.   Musculoskeletal: Positive for neck pain.   Skin: Negative for rash.   Neurological: Positive for headaches. Negative for tingling, sensory change and weakness.     PMH:  has a past medical history of Allergy; Anxiety; ASTHMA; Breast calcification seen on mammogram; Breath shortness; Depression; Eczema (8/17/2009); Heart burn; Hiatal hernia (8/17/2009); Hypothyroid (8/17/2009); Menopause (8/17/2009); Pain; Tinnitus; and Ulcer. She also has no past medical history of CAD (coronary artery disease); COPD; Liver disease;  Psychiatric disorder; or Seizure disorder (HCC).  MEDS:   Current Outpatient Prescriptions:   •  cyclobenzaprine (FLEXERIL) 5 MG tablet, Take 1-2 Tabs by mouth 3 times a day as needed., Disp: 30 Tab, Rfl: 0  •  MethylPREDNISolone (MEDROL DOSEPAK) 4 MG Tablet Therapy Pack, Take as directed, Disp: 1 Kit, Rfl: 0  •  acetaminophen (TYLENOL) 500 MG Tab, Take 1,000 mg by mouth 1 time daily as needed., Disp: , Rfl:   •  estradiol (ESTRACE) 1 MG Tab, Take 1 mg by mouth every bedtime., Disp: , Rfl:   •  liothyronine (CYTOMEL) 25 MCG Tab, Take 25 mcg by mouth every morning., Disp: , Rfl:   •  omeprazole (PRILOSEC) 20 MG delayed-release capsule, Take 20 mg by mouth 2 times a day., Disp: , Rfl:   •  progesterone (PROMETRIUM) 100 MG Cap, Take 100 mg by mouth every bedtime., Disp: , Rfl:   •  levothyroxine (SYNTHROID) 200 MCG Tab, Take 1 Tab by mouth every morning. ON A EMPTY STOMACH, Disp: 90 Tab, Rfl: 2  •  ADVAIR DISKUS 500-50 MCG/DOSE AEPB, USE 1 INHALATION ORALLY    TWICE DAILY, Disp: 1 Each, Rfl: 3  •  SUMAtriptan (IMITREX) 25 MG Tab tablet, Take 25 mg by mouth Once PRN for Migraine., Disp: , Rfl:   •  Oxycodone-Acetaminophen (PERCOCET PO), Take 1 Tab by mouth 1 time daily as needed., Disp: , Rfl:   •  clotrimazole-betamethasone (LOTRISONE) 1-0.05 % Cream, Apply 1 Application to affected area(s) 2 times a day as needed. Eczema - 3 weeks on, 1 week off  PT STARTED A WEEK AGO (6/12/2018), Disp: , Rfl:   •  sucralfate (CARAFATE) 1 GM Tab, Take 1 g by mouth 4 Times a Day,Before Meals and at Bedtime., Disp: , Rfl:   ALLERGIES:   Allergies   Allergen Reactions   • Nkda [No Known Drug Allergy]      SURGHX:   Past Surgical History:   Procedure Laterality Date   • GASTROSCOPY  8/1/2018    Procedure: GASTROSCOPY- WITH DILATION;  Surgeon: Jamaal Aaron M.D.;  Location: SURGERY [facility];  Service: General   • NISSEN FUNDOPLICATION LAPAROSCOPIC  5/16/2018    Procedure: NISSEN FUNDOPLICATION LAPAROSCOPIC- FOR RECURRENT HIATAL  "HERNIA;  Surgeon: Jamaal Aaron M.D.;  Location: SURGERY [facility];  Service: General   • OTHER ABDOMINAL SURGERY  2007    revision of gastric pouch   • HERNIA REPAIR  2005    hiatal   • GASTRIC BYPASS LAPAROSCOPIC  2004   • TUBAL COAGULATION LAPAROSCOPIC BILATERAL  1990   • BREAST BIOPSY      microcalcification   • BREAST BIOPSY      left benign breast biopsy in 1994   • CHOLECYSTECTOMY  1993 or 1994    laparoscopic     SOCHX:  reports that she quit smoking about 12 years ago. Her smoking use included Cigarettes. She has a 2.50 pack-year smoking history. She has never used smokeless tobacco. She reports that she does not drink alcohol or use drugs.  FH: Family history was reviewed, no pertinent findings to report       Objective:     /80   Pulse 67   Temp 36.4 °C (97.6 °F) (Temporal)   Resp 16   Ht 1.664 m (5' 5.5\")   Wt 106.1 kg (234 lb)   SpO2 97%   BMI 38.35 kg/m²        Physical Exam   Constitutional: She is oriented to person, place, and time. She appears well-developed and well-nourished.   HENT:   Head: Normocephalic and atraumatic.   Right Ear: External ear normal.   Left Ear: External ear normal.   Eyes: Pupils are equal, round, and reactive to light. Conjunctivae are normal.   Cardiovascular: Normal rate, regular rhythm and normal heart sounds.    No murmur heard.  Pulmonary/Chest: Effort normal and breath sounds normal. She has no wheezes.   Musculoskeletal:        Cervical back: She exhibits decreased range of motion and tenderness. She exhibits no bony tenderness, no swelling and no deformity.   Spinous processes of cervical and thoracic spine are nontender to palpation with no step-offs or obvious deformities identified.  Left cervical paraspinous muscles and superior margin of trapezius muscle are tender to palpation.  Patient has pain and markedly decreased ROM with left lateral rotation and left lateral flexion.  No erythema, ecchymosis, swelling, or obvious deformity identified " on exam.   Neurological: She is alert and oriented to person, place, and time. She has normal strength. No sensory deficit.   Skin: Skin is warm and dry. Capillary refill takes less than 2 seconds.   Psychiatric: She has a normal mood and affect. Her behavior is normal. Judgment normal.          Assessment/Plan:     1. Cervical strain, acute, initial encounter  - cyclobenzaprine (FLEXERIL) 5 MG tablet; Take 1-2 Tabs by mouth 3 times a day as needed.  Dispense: 30 Tab; Refill: 0  - MethylPREDNISolone (MEDROL DOSEPAK) 4 MG Tablet Therapy Pack; Take as directed  Dispense: 1 Kit; Refill: 0  - Continue to alternate ice/heat  - Follow-up with PCP        Differential Diagnosis, natural history, and supportive care discussed. Return to the Urgent Care or follow up with your PCP if symptoms fail to resolve, or for any new or worsening symptoms. Emergency room precautions discussed. Patient and/or family appears understanding of information.

## 2019-02-04 ENCOUNTER — TELEPHONE (OUTPATIENT)
Dept: ENDOCRINOLOGY | Facility: MEDICAL CENTER | Age: 54
End: 2019-02-04

## 2019-02-05 DIAGNOSIS — E55.9 VITAMIN D DEFICIENCY: ICD-10-CM

## 2019-02-05 DIAGNOSIS — E53.8 VITAMIN B 12 DEFICIENCY: ICD-10-CM

## 2019-02-05 DIAGNOSIS — E03.9 HYPOTHYROIDISM, UNSPECIFIED TYPE: ICD-10-CM

## 2019-02-06 NOTE — TELEPHONE ENCOUNTER
Phone Number Called: [phone] (home)     Message: Called Pt, she will get labs done at a [facility] facility. She has also been scheduled for an appointment with Dr. rebolledo in March.     Left Message for patient to call back: N\A

## 2019-03-12 ENCOUNTER — HOSPITAL ENCOUNTER (OUTPATIENT)
Dept: LAB | Facility: MEDICAL CENTER | Age: 54
End: 2019-03-12
Attending: INTERNAL MEDICINE
Payer: COMMERCIAL

## 2019-03-12 DIAGNOSIS — E03.9 HYPOTHYROIDISM, UNSPECIFIED TYPE: ICD-10-CM

## 2019-03-12 DIAGNOSIS — E55.9 VITAMIN D DEFICIENCY: ICD-10-CM

## 2019-03-12 DIAGNOSIS — E53.8 VITAMIN B 12 DEFICIENCY: ICD-10-CM

## 2019-03-12 LAB
25(OH)D3 SERPL-MCNC: 34 NG/ML (ref 30–100)
T3 SERPL-MCNC: 190.2 NG/DL (ref 60–181)
T3FREE SERPL-MCNC: 5.79 PG/ML (ref 2.4–4.2)
T4 FREE SERPL-MCNC: 1.44 NG/DL (ref 0.53–1.43)
TSH SERPL DL<=0.005 MIU/L-ACNC: 0.02 UIU/ML (ref 0.38–5.33)
VIT B12 SERPL-MCNC: 261 PG/ML (ref 211–911)

## 2019-03-12 PROCEDURE — 82306 VITAMIN D 25 HYDROXY: CPT

## 2019-03-12 PROCEDURE — 84443 ASSAY THYROID STIM HORMONE: CPT

## 2019-03-12 PROCEDURE — 36415 COLL VENOUS BLD VENIPUNCTURE: CPT

## 2019-03-12 PROCEDURE — 84481 FREE ASSAY (FT-3): CPT

## 2019-03-12 PROCEDURE — 84480 ASSAY TRIIODOTHYRONINE (T3): CPT

## 2019-03-12 PROCEDURE — 84439 ASSAY OF FREE THYROXINE: CPT

## 2019-03-12 PROCEDURE — 82607 VITAMIN B-12: CPT

## 2019-03-28 NOTE — PROGRESS NOTES
Endocrinology Clinic Progress Note  PCP: Scarlet Kramer M.D.    HPI:  Rosy Garay is a 53 y.o. old patient who comes in today for review of her hypothyroid, menopause and vitamin d deficiency.   She is currently on Levothyroxine 200 mcg per day and Liothyronine 25 mcg daily for her hypothyroid.  Menopause states her prescription for the  estrogen 1 mg per day and progesterone 100 mg per day ran out, she states she does not feel any different off of the medications, would like to stop.      ROS:  Constitutional: fatigue, No weight loss  Cardiac: No palpitations or racing heart  Resp: No shortness of breath  Neuro: No numbness or tinging in feet  Endo: No heat or cold intolerance, no polyuria or polydipsia  All other systems were reviewed and were negative.    Past Medical History:  Patient Active Problem List    Diagnosis Date Noted   • Low back pain 11/22/2013   • Radicular pain 11/22/2013   • Anxiety 11/22/2013   • Migraine headache with aura 10/14/2013   • Acute asthma exacerbation 05/13/2013   • Obesity, morbid (more than 100 lbs over ideal weight or BMI > 40) ([facility]) 05/13/2013   • Vitamin D deficiency 07/17/2012   • Eczema 08/17/2009   • Asthma 08/17/2009   • Hiatal hernia 08/17/2009   • Menopause 08/17/2009   • Hypothyroid 08/17/2009   • Personal history of gastric bypass 08/17/2009   • Hypercholesterolemia 08/17/2009       Past Surgical History:  Past Surgical History:   Procedure Laterality Date   • GASTROSCOPY  8/1/2018    Procedure: GASTROSCOPY- WITH DILATION;  Surgeon: Jamaal Aaron M.D.;  Location: SURGERY [facility];  Service: General   • NISSEN FUNDOPLICATION LAPAROSCOPIC  5/16/2018    Procedure: NISSEN FUNDOPLICATION LAPAROSCOPIC- FOR RECURRENT HIATAL HERNIA;  Surgeon: Jamaal Aaron M.D.;  Location: SURGERY [facility];  Service: General   • OTHER ABDOMINAL SURGERY  2007    revision of gastric pouch   • HERNIA REPAIR  2005    hiatal   • GASTRIC BYPASS LAPAROSCOPIC  2004   • TUBAL  COAGULATION LAPAROSCOPIC BILATERAL  1990   • BREAST BIOPSY      microcalcification   • BREAST BIOPSY      left benign breast biopsy in 1994   • CHOLECYSTECTOMY  1993 or 1994    laparoscopic       Allergies:  Nkda [no known drug allergy]    Social History:  Social History     Social History   • Marital status:      Spouse name: N/A   • Number of children: N/A   • Years of education: N/A     Occupational History   • Not on file.     Social History Main Topics   • Smoking status: Former Smoker     Packs/day: 0.25     Years: 10.00     Types: Cigarettes     Quit date: 1/2/2006   • Smokeless tobacco: Never Used      Comment: 5 cigarettes a day   • Alcohol use No   • Drug use: No   • Sexual activity: Yes     Partners: Male     Birth control/ protection: Post-Menopausal      Comment: ,      Other Topics Concern   • Exercise Yes   • Bike Helmet No   • Seat Belt Yes   • Self-Exams Yes     Social History Narrative   • No narrative on file       Family History:  Family History   Problem Relation Age of Onset   • Heart Disease Mother         MI   • Hyperlipidemia Mother    • Heart Disease Father         MI x3, widowmaker   • Lung Disease Father         COPD   • Hyperlipidemia Father    • Heart Disease Maternal Grandmother         MI double bypass   • Cancer Maternal Grandmother         breast   • Cancer Maternal Grandfather         colon and bile duct-diagnosed in 60's   • Heart Disease Paternal Grandmother         MI       Medications:    Current Outpatient Prescriptions:   •  vitamin D, Ergocalciferol, (DRISDOL) 63892 units Cap capsule, Take 1 Cap by mouth every 7 days., Disp: 12 Cap, Rfl: 3  •  liothyronine (CYTOMEL) 25 MCG Tab, Take 25 mcg by mouth every morning., Disp: , Rfl:   •  omeprazole (PRILOSEC) 20 MG delayed-release capsule, Take 20 mg by mouth 2 times a day., Disp: , Rfl:   •  levothyroxine (SYNTHROID) 200 MCG Tab, Take 1 Tab by mouth every morning. ON A EMPTY STOMACH, Disp: 90 Tab, Rfl:  "2  •  ADVAIR DISKUS 500-50 MCG/DOSE AEPB, USE 1 INHALATION ORALLY    TWICE DAILY, Disp: 1 Each, Rfl: 3  •  acetaminophen (TYLENOL) 500 MG Tab, Take 1,000 mg by mouth 1 time daily as needed., Disp: , Rfl:   •  SUMAtriptan (IMITREX) 25 MG Tab tablet, Take 25 mg by mouth Once PRN for Migraine., Disp: , Rfl:   •  clotrimazole-betamethasone (LOTRISONE) 1-0.05 % Cream, Apply 1 Application to affected area(s) 2 times a day as needed. Eczema - 3 weeks on, 1 week off  PT STARTED A WEEK AGO (6/12/2018), Disp: , Rfl:   •  estradiol (ESTRACE) 1 MG Tab, Take 1 mg by mouth every bedtime., Disp: , Rfl:   •  progesterone (PROMETRIUM) 100 MG Cap, Take 100 mg by mouth every bedtime., Disp: , Rfl:     Labs: Reviewed    Physical Examination:  Vital signs: /62   Pulse 99   Ht 1.664 m (5' 5.5\")   Wt 110.5 kg (243 lb 9.6 oz)   SpO2 100%   BMI 39.92 kg/m²  Body mass index is 39.92 kg/m².  General: No apparent distress, cooperative  Eyes: No scleral icterus or discharge  ENMT: Normal on external inspection of nose, lips, normal thyroid exam  Neck: No abnormal masses on inspection  Resp: Normal effort, clear to auscultation bilaterally   CVS: Regular rate and rhythm, S1 S2 normal, no murmur   Extremities: No edema  Abdomen: abdominal obesity present  Neuro: Alert and oriented  Skin: No rash  Psych: Normal mood and affect, intact memory and able to make informed decisions    Assessment and Plan:    1. Hypothyroidism due to Hashimoto's thyroiditis  Continue levothyroxine.    2. Vitamin D deficiency   vit D as prescribed.    3. Vit B 12 def: sublingual B 12 - 3454-5904 mcg daily.     4. Menopause  Wants to stop estradiol and progesterone and I am okay with it.     Return in about 3 months (around 7/1/2019).    Thank you for allowing me to participate in the care of this patient.    Héctor Wang  03/28/19    CC:   Scarlet Kramer M.D.    This note was created using voice recognition software (Dragon). The accuracy of the dictation " is limited by the abilities of the software. I have reviewed the note prior to signing, however some errors in grammar and context are still possible. If you have any questions related to this note please do not hesitate to contact our office.   This note was scribed by Marilu Oneal RN, CDE

## 2019-04-01 ENCOUNTER — OFFICE VISIT (OUTPATIENT)
Dept: ENDOCRINOLOGY | Facility: MEDICAL CENTER | Age: 54
End: 2019-04-01
Payer: COMMERCIAL

## 2019-04-01 VITALS
DIASTOLIC BLOOD PRESSURE: 62 MMHG | HEART RATE: 99 BPM | BODY MASS INDEX: 39.15 KG/M2 | SYSTOLIC BLOOD PRESSURE: 118 MMHG | OXYGEN SATURATION: 100 % | HEIGHT: 66 IN | WEIGHT: 243.6 LBS

## 2019-04-01 DIAGNOSIS — Z78.0 MENOPAUSE: ICD-10-CM

## 2019-04-01 DIAGNOSIS — E06.3 HYPOTHYROIDISM DUE TO HASHIMOTO'S THYROIDITIS: ICD-10-CM

## 2019-04-01 DIAGNOSIS — E53.8 VITAMIN B 12 DEFICIENCY: ICD-10-CM

## 2019-04-01 DIAGNOSIS — E03.8 HYPOTHYROIDISM DUE TO HASHIMOTO'S THYROIDITIS: ICD-10-CM

## 2019-04-01 DIAGNOSIS — E55.9 VITAMIN D DEFICIENCY: ICD-10-CM

## 2019-04-01 PROCEDURE — 99214 OFFICE O/P EST MOD 30 MIN: CPT | Performed by: INTERNAL MEDICINE

## 2019-04-01 RX ORDER — ERGOCALCIFEROL 1.25 MG/1
50000 CAPSULE ORAL
Qty: 12 CAP | Refills: 3 | Status: SHIPPED | OUTPATIENT
Start: 2019-04-01 | End: 2020-11-16 | Stop reason: SDUPTHER

## 2019-05-15 DIAGNOSIS — E03.8 HYPOTHYROIDISM DUE TO HASHIMOTO'S THYROIDITIS: ICD-10-CM

## 2019-05-15 DIAGNOSIS — E06.3 HYPOTHYROIDISM DUE TO HASHIMOTO'S THYROIDITIS: ICD-10-CM

## 2019-05-15 RX ORDER — LEVOTHYROXINE SODIUM 0.2 MG/1
TABLET ORAL
Qty: 90 TAB | Refills: 0 | Status: CANCELLED | OUTPATIENT
Start: 2019-05-15

## 2019-05-17 DIAGNOSIS — E03.8 HYPOTHYROIDISM DUE TO HASHIMOTO'S THYROIDITIS: ICD-10-CM

## 2019-05-17 DIAGNOSIS — E06.3 HYPOTHYROIDISM DUE TO HASHIMOTO'S THYROIDITIS: ICD-10-CM

## 2019-05-17 RX ORDER — LEVOTHYROXINE SODIUM 0.2 MG/1
200 TABLET ORAL EVERY MORNING
Qty: 90 TAB | Refills: 3 | Status: SHIPPED | OUTPATIENT
Start: 2019-05-17 | End: 2020-11-16 | Stop reason: SDUPTHER

## 2019-05-17 RX ORDER — LIOTHYRONINE SODIUM 25 UG/1
25 TABLET ORAL EVERY MORNING
Qty: 90 TAB | Refills: 3 | Status: SHIPPED | OUTPATIENT
Start: 2019-05-17 | End: 2019-07-03

## 2019-05-28 RX ORDER — LIOTHYRONINE SODIUM 25 UG/1
TABLET ORAL
Qty: 90 TAB | Refills: 0 | Status: SHIPPED | OUTPATIENT
Start: 2019-05-28 | End: 2020-11-16 | Stop reason: SDUPTHER

## 2019-06-20 ENCOUNTER — HOSPITAL ENCOUNTER (OUTPATIENT)
Dept: LAB | Facility: MEDICAL CENTER | Age: 54
End: 2019-06-20
Attending: INTERNAL MEDICINE
Payer: COMMERCIAL

## 2019-06-20 DIAGNOSIS — E53.8 VITAMIN B 12 DEFICIENCY: ICD-10-CM

## 2019-06-20 DIAGNOSIS — E06.3 HYPOTHYROIDISM DUE TO HASHIMOTO'S THYROIDITIS: ICD-10-CM

## 2019-06-20 DIAGNOSIS — E03.8 HYPOTHYROIDISM DUE TO HASHIMOTO'S THYROIDITIS: ICD-10-CM

## 2019-06-20 DIAGNOSIS — E55.9 VITAMIN D DEFICIENCY: ICD-10-CM

## 2019-06-20 LAB
25(OH)D3 SERPL-MCNC: 70 NG/ML (ref 30–100)
ALBUMIN SERPL BCP-MCNC: 4.4 G/DL (ref 3.2–4.9)
ALBUMIN/GLOB SERPL: 1.6 G/DL
ALP SERPL-CCNC: 55 U/L (ref 30–99)
ALT SERPL-CCNC: 20 U/L (ref 2–50)
ANION GAP SERPL CALC-SCNC: 10 MMOL/L (ref 0–11.9)
AST SERPL-CCNC: 21 U/L (ref 12–45)
BASOPHILS # BLD AUTO: 1.6 % (ref 0–1.8)
BASOPHILS # BLD: 0.11 K/UL (ref 0–0.12)
BILIRUB SERPL-MCNC: 0.5 MG/DL (ref 0.1–1.5)
BUN SERPL-MCNC: 14 MG/DL (ref 8–22)
CALCIUM SERPL-MCNC: 9.4 MG/DL (ref 8.5–10.5)
CHLORIDE SERPL-SCNC: 106 MMOL/L (ref 96–112)
CHOLEST SERPL-MCNC: 169 MG/DL (ref 100–199)
CO2 SERPL-SCNC: 24 MMOL/L (ref 20–33)
CREAT SERPL-MCNC: 1.06 MG/DL (ref 0.5–1.4)
EOSINOPHIL # BLD AUTO: 1.01 K/UL (ref 0–0.51)
EOSINOPHIL NFR BLD: 14.5 % (ref 0–6.9)
ERYTHROCYTE [DISTWIDTH] IN BLOOD BY AUTOMATED COUNT: 42.3 FL (ref 35.9–50)
EST. AVERAGE GLUCOSE BLD GHB EST-MCNC: 117 MG/DL
FASTING STATUS PATIENT QL REPORTED: NORMAL
GLOBULIN SER CALC-MCNC: 2.7 G/DL (ref 1.9–3.5)
GLUCOSE SERPL-MCNC: 114 MG/DL (ref 65–99)
HBA1C MFR BLD: 5.7 % (ref 0–5.6)
HCT VFR BLD AUTO: 47 % (ref 37–47)
HDLC SERPL-MCNC: 54 MG/DL
HGB BLD-MCNC: 15.3 G/DL (ref 12–16)
IMM GRANULOCYTES # BLD AUTO: 0.01 K/UL (ref 0–0.11)
IMM GRANULOCYTES NFR BLD AUTO: 0.1 % (ref 0–0.9)
LDLC SERPL CALC-MCNC: 101 MG/DL
LYMPHOCYTES # BLD AUTO: 2.24 K/UL (ref 1–4.8)
LYMPHOCYTES NFR BLD: 32.1 % (ref 22–41)
MCH RBC QN AUTO: 29.3 PG (ref 27–33)
MCHC RBC AUTO-ENTMCNC: 32.6 G/DL (ref 33.6–35)
MCV RBC AUTO: 90 FL (ref 81.4–97.8)
MONOCYTES # BLD AUTO: 0.47 K/UL (ref 0–0.85)
MONOCYTES NFR BLD AUTO: 6.7 % (ref 0–13.4)
NEUTROPHILS # BLD AUTO: 3.13 K/UL (ref 2–7.15)
NEUTROPHILS NFR BLD: 45 % (ref 44–72)
NRBC # BLD AUTO: 0 K/UL
NRBC BLD-RTO: 0 /100 WBC
PLATELET # BLD AUTO: 254 K/UL (ref 164–446)
PMV BLD AUTO: 11.5 FL (ref 9–12.9)
POTASSIUM SERPL-SCNC: 4 MMOL/L (ref 3.6–5.5)
PROT SERPL-MCNC: 7.1 G/DL (ref 6–8.2)
RBC # BLD AUTO: 5.22 M/UL (ref 4.2–5.4)
SODIUM SERPL-SCNC: 140 MMOL/L (ref 135–145)
T3 SERPL-MCNC: 99.4 NG/DL (ref 60–181)
T3FREE SERPL-MCNC: 3.01 PG/ML (ref 2.4–4.2)
T4 FREE SERPL-MCNC: 1.11 NG/DL (ref 0.53–1.43)
T4 FREE SERPL-MCNC: 1.12 NG/DL (ref 0.53–1.43)
TRIGL SERPL-MCNC: 72 MG/DL (ref 0–149)
TSH SERPL DL<=0.005 MIU/L-ACNC: 0.05 UIU/ML (ref 0.38–5.33)
TSH SERPL DL<=0.005 MIU/L-ACNC: 0.05 UIU/ML (ref 0.38–5.33)
VIT B12 SERPL-MCNC: 1246 PG/ML (ref 211–911)
WBC # BLD AUTO: 7 K/UL (ref 4.8–10.8)

## 2019-06-20 PROCEDURE — 36415 COLL VENOUS BLD VENIPUNCTURE: CPT

## 2019-06-20 PROCEDURE — 84439 ASSAY OF FREE THYROXINE: CPT | Mod: 91

## 2019-06-20 PROCEDURE — 83036 HEMOGLOBIN GLYCOSYLATED A1C: CPT

## 2019-06-20 PROCEDURE — 84480 ASSAY TRIIODOTHYRONINE (T3): CPT

## 2019-06-20 PROCEDURE — 84439 ASSAY OF FREE THYROXINE: CPT

## 2019-06-20 PROCEDURE — 84481 FREE ASSAY (FT-3): CPT

## 2019-06-20 PROCEDURE — 84443 ASSAY THYROID STIM HORMONE: CPT

## 2019-06-20 PROCEDURE — 82607 VITAMIN B-12: CPT

## 2019-06-20 PROCEDURE — 82306 VITAMIN D 25 HYDROXY: CPT

## 2019-06-20 PROCEDURE — 80053 COMPREHEN METABOLIC PANEL: CPT

## 2019-06-20 PROCEDURE — 84443 ASSAY THYROID STIM HORMONE: CPT | Mod: 91

## 2019-06-20 PROCEDURE — 85025 COMPLETE CBC W/AUTO DIFF WBC: CPT

## 2019-06-20 PROCEDURE — 80061 LIPID PANEL: CPT

## 2019-07-01 NOTE — PROGRESS NOTES
Endocrinology Clinic Progress Note  PCP: Scarlet Kramer M.D.    HPI:  Rosy Garay is a 54 y.o. old patient who comes in today for review of hypothyroidism and vitamin d deficiency.     She is currently on Liothyronine 25 mg per day and Levothyroxine 200 mcg per day for her hypothyroid.   Vitamin D 13425 iu per week, for vitamin d deficiency.        ROS:  Constitutional: No weight loss  Cardiac: No palpitations or racing heart  Resp: No shortness of breath  Neuro: No numbness or tinging in feet  Endo: No heat or cold intolerance, no polyuria or polydipsia  All other systems were reviewed and were negative.    Past Medical History:  Patient Active Problem List    Diagnosis Date Noted   • Low back pain 11/22/2013   • Radicular pain 11/22/2013   • Anxiety 11/22/2013   • Migraine headache with aura 10/14/2013   • Acute asthma exacerbation 05/13/2013   • Obesity, morbid (more than 100 lbs over ideal weight or BMI > 40) ([facility]) 05/13/2013   • Vitamin D deficiency 07/17/2012   • Eczema 08/17/2009   • Asthma 08/17/2009   • Hiatal hernia 08/17/2009   • Menopause 08/17/2009   • Hypothyroid 08/17/2009   • Personal history of gastric bypass 08/17/2009   • Hypercholesterolemia 08/17/2009       Past Surgical History:  Past Surgical History:   Procedure Laterality Date   • GASTROSCOPY  8/1/2018    Procedure: GASTROSCOPY- WITH DILATION;  Surgeon: Jamaal Aaron M.D.;  Location: SURGERY [facility];  Service: General   • NISSEN FUNDOPLICATION LAPAROSCOPIC  5/16/2018    Procedure: NISSEN FUNDOPLICATION LAPAROSCOPIC- FOR RECURRENT HIATAL HERNIA;  Surgeon: Jamaal Aaron M.D.;  Location: SURGERY [facility];  Service: General   • OTHER ABDOMINAL SURGERY  2007    revision of gastric pouch   • HERNIA REPAIR  2005    hiatal   • GASTRIC BYPASS LAPAROSCOPIC  2004   • TUBAL COAGULATION LAPAROSCOPIC BILATERAL  1990   • BREAST BIOPSY      microcalcification   • BREAST BIOPSY      left benign breast biopsy in 1994   •  CHOLECYSTECTOMY  1993 or 1994    laparoscopic       Allergies:  Nkda [no known drug allergy]    Social History:  Social History     Social History   • Marital status:      Spouse name: N/A   • Number of children: N/A   • Years of education: N/A     Occupational History   • Not on file.     Social History Main Topics   • Smoking status: Former Smoker     Packs/day: 0.25     Years: 10.00     Types: Cigarettes     Quit date: 1/2/2006   • Smokeless tobacco: Never Used      Comment: 5 cigarettes a day   • Alcohol use No   • Drug use: No   • Sexual activity: Yes     Partners: Male     Birth control/ protection: Post-Menopausal      Comment: ,      Other Topics Concern   • Exercise Yes   • Bike Helmet No   • Seat Belt Yes   • Self-Exams Yes     Social History Narrative   • No narrative on file       Family History:  Family History   Problem Relation Age of Onset   • Heart Disease Mother         MI   • Hyperlipidemia Mother    • Heart Disease Father         MI x3, widowmaker   • Lung Disease Father         COPD   • Hyperlipidemia Father    • Heart Disease Maternal Grandmother         MI double bypass   • Cancer Maternal Grandmother         breast   • Cancer Maternal Grandfather         colon and bile duct-diagnosed in 60's   • Heart Disease Paternal Grandmother         MI       Medications:    Current Outpatient Prescriptions:   •  Naltrexone-buPROPion HCl ER (CONTRAVE) 8-90 MG TABLET SR 12 HR, Take  by mouth., Disp: , Rfl:   •  liothyronine (CYTOMEL) 25 MCG Tab, TAKE 1 TABLET BY MOUTH EVERY DAY, Disp: 90 Tab, Rfl: 0  •  levothyroxine (SYNTHROID) 200 MCG Tab, Take 1 Tab by mouth every morning. ON A EMPTY STOMACH, Disp: 90 Tab, Rfl: 3  •  vitamin D, Ergocalciferol, (DRISDOL) 68687 units Cap capsule, Take 1 Cap by mouth every 7 days., Disp: 12 Cap, Rfl: 3  •  omeprazole (PRILOSEC) 20 MG delayed-release capsule, Take 20 mg by mouth 2 times a day., Disp: , Rfl:   •  raNITidine (ZANTAC) 150 MG Tab, ,  "Disp: , Rfl:   •  acetaminophen (TYLENOL) 500 MG Tab, Take 1,000 mg by mouth 1 time daily as needed., Disp: , Rfl:   •  SUMAtriptan (IMITREX) 25 MG Tab tablet, Take 25 mg by mouth Once PRN for Migraine., Disp: , Rfl:   •  clotrimazole-betamethasone (LOTRISONE) 1-0.05 % Cream, Apply 1 Application to affected area(s) 2 times a day as needed. Eczema - 3 weeks on, 1 week off  PT STARTED A WEEK AGO (6/12/2018), Disp: , Rfl:     Labs: Reviewed    Physical Examination:  Vital signs: /80   Pulse (!) 121   Ht 1.664 m (5' 5.5\")   Wt 108.3 kg (238 lb 12.8 oz)   SpO2 95%   BMI 39.13 kg/m²  Body mass index is 39.13 kg/m².  General: No apparent distress, cooperative  Eyes: No scleral icterus or discharge  ENMT: Normal on external inspection of nose, lips, normal thyroid exam  Neck: No abnormal masses on inspection  Resp: Normal effort, clear to auscultation bilaterally   CVS: Regular rate and rhythm, S1 S2 normal, no murmur   Extremities: No edema  Abdomen: abdominal obesity present  Neuro: Alert and oriented  Skin: No rash  Psych: Normal mood and affect, intact memory and able to make informed decisions    Assessment and Plan:  1. Hypothyroidism due to Hashimoto's thyroiditis  Continue current dose of levothyroxine and liothyronine    2. Vitamin D deficiency  As the levels are in good range can now take vitamin D 50,000 units every 10 days.    Return in about 3 months (around 10/3/2019).    Thank you for allowing me to participate in the care of this patient.    Héctor Wang  07/01/19    CC:   Scarlet Kramer M.D.    This note was created using voice recognition software (Dragon). The accuracy of the dictation is limited by the abilities of the software. I have reviewed the note prior to signing, however some errors in grammar and context are still possible. If you have any questions related to this note please do not hesitate to contact our office.   This note was scribed by Marilu Oneal RN, CDE  "

## 2019-07-03 ENCOUNTER — OFFICE VISIT (OUTPATIENT)
Dept: ENDOCRINOLOGY | Facility: MEDICAL CENTER | Age: 54
End: 2019-07-03
Payer: COMMERCIAL

## 2019-07-03 VITALS
WEIGHT: 238.8 LBS | BODY MASS INDEX: 38.38 KG/M2 | DIASTOLIC BLOOD PRESSURE: 80 MMHG | OXYGEN SATURATION: 95 % | SYSTOLIC BLOOD PRESSURE: 108 MMHG | HEIGHT: 66 IN | HEART RATE: 121 BPM

## 2019-07-03 DIAGNOSIS — E55.9 VITAMIN D DEFICIENCY: ICD-10-CM

## 2019-07-03 DIAGNOSIS — E06.3 HYPOTHYROIDISM DUE TO HASHIMOTO'S THYROIDITIS: ICD-10-CM

## 2019-07-03 DIAGNOSIS — E03.8 HYPOTHYROIDISM DUE TO HASHIMOTO'S THYROIDITIS: ICD-10-CM

## 2019-07-03 PROCEDURE — 99214 OFFICE O/P EST MOD 30 MIN: CPT | Performed by: INTERNAL MEDICINE

## 2019-07-03 RX ORDER — RANITIDINE 150 MG/1
TABLET ORAL
COMMUNITY
Start: 2019-07-02 | End: 2020-09-23

## 2019-07-27 ENCOUNTER — HOSPITAL ENCOUNTER (OUTPATIENT)
Dept: RADIOLOGY | Facility: MEDICAL CENTER | Age: 54
End: 2019-07-27
Attending: INTERNAL MEDICINE
Payer: COMMERCIAL

## 2019-07-27 DIAGNOSIS — Z12.31 ENCOUNTER FOR SCREENING MAMMOGRAM FOR MALIGNANT NEOPLASM OF BREAST: ICD-10-CM

## 2019-07-27 PROCEDURE — 77067 SCR MAMMO BI INCL CAD: CPT

## 2019-10-28 ENCOUNTER — HOSPITAL ENCOUNTER (OUTPATIENT)
Dept: LAB | Facility: MEDICAL CENTER | Age: 54
End: 2019-10-28
Attending: INTERNAL MEDICINE
Payer: COMMERCIAL

## 2019-10-28 DIAGNOSIS — E55.9 VITAMIN D DEFICIENCY: ICD-10-CM

## 2019-10-28 DIAGNOSIS — E03.8 HYPOTHYROIDISM DUE TO HASHIMOTO'S THYROIDITIS: ICD-10-CM

## 2019-10-28 DIAGNOSIS — E06.3 HYPOTHYROIDISM DUE TO HASHIMOTO'S THYROIDITIS: ICD-10-CM

## 2019-10-28 LAB
25(OH)D3 SERPL-MCNC: 59 NG/ML (ref 30–100)
T3 SERPL-MCNC: 133.2 NG/DL (ref 60–181)
T3FREE SERPL-MCNC: 3.77 PG/ML (ref 2.4–4.2)
T4 FREE SERPL-MCNC: 0.93 NG/DL (ref 0.53–1.43)
TSH SERPL DL<=0.005 MIU/L-ACNC: 0.02 UIU/ML (ref 0.38–5.33)

## 2019-10-28 PROCEDURE — 84480 ASSAY TRIIODOTHYRONINE (T3): CPT

## 2019-10-28 PROCEDURE — 84481 FREE ASSAY (FT-3): CPT

## 2019-10-28 PROCEDURE — 84443 ASSAY THYROID STIM HORMONE: CPT

## 2019-10-28 PROCEDURE — 82306 VITAMIN D 25 HYDROXY: CPT

## 2019-10-28 PROCEDURE — 36415 COLL VENOUS BLD VENIPUNCTURE: CPT

## 2019-10-28 PROCEDURE — 84439 ASSAY OF FREE THYROXINE: CPT

## 2019-11-05 ENCOUNTER — OFFICE VISIT (OUTPATIENT)
Dept: ENDOCRINOLOGY | Facility: MEDICAL CENTER | Age: 54
End: 2019-11-05
Payer: COMMERCIAL

## 2019-11-05 VITALS
DIASTOLIC BLOOD PRESSURE: 80 MMHG | SYSTOLIC BLOOD PRESSURE: 120 MMHG | BODY MASS INDEX: 38.41 KG/M2 | HEIGHT: 66 IN | WEIGHT: 239 LBS | HEART RATE: 96 BPM | OXYGEN SATURATION: 97 %

## 2019-11-05 DIAGNOSIS — E03.8 HYPOTHYROIDISM DUE TO HASHIMOTO'S THYROIDITIS: ICD-10-CM

## 2019-11-05 DIAGNOSIS — E53.8 VITAMIN B 12 DEFICIENCY: ICD-10-CM

## 2019-11-05 DIAGNOSIS — E06.3 HYPOTHYROIDISM DUE TO HASHIMOTO'S THYROIDITIS: ICD-10-CM

## 2019-11-05 DIAGNOSIS — E55.9 VITAMIN D DEFICIENCY: ICD-10-CM

## 2019-11-05 PROCEDURE — 99214 OFFICE O/P EST MOD 30 MIN: CPT | Performed by: INTERNAL MEDICINE

## 2019-11-05 RX ORDER — PHENTERMINE HYDROCHLORIDE 37.5 MG/1
37.5 CAPSULE ORAL EVERY MORNING
Qty: 30 CAP | Refills: 3 | Status: SHIPPED | OUTPATIENT
Start: 2019-11-05 | End: 2020-03-25 | Stop reason: SDUPTHER

## 2019-11-05 RX ORDER — PHENTERMINE HYDROCHLORIDE 15 MG/1
15 CAPSULE ORAL EVERY MORNING
Qty: 15 CAP | Refills: 0 | Status: SHIPPED | OUTPATIENT
Start: 2019-11-05 | End: 2019-11-20

## 2019-11-05 RX ORDER — OMEPRAZOLE 40 MG/1
40 CAPSULE, DELAYED RELEASE ORAL 2 TIMES DAILY
COMMUNITY

## 2019-11-05 NOTE — PROGRESS NOTES
Endocrinology Clinic Progress Note  PCP: Scarlet Kramer M.D.    HPI:  Ortiz Trujillo is a 54 y.o. old patient who comes in today for review of endocrine problems.  She is concerned about weight gain and always being hungry.  She did well on Phentermine in the past.    Hypothyroidism:  Currently taking Levothyroxine 200 mcg 5 days/week and Cytomel 25 mcg 5 days/week. Results for ORTIZ TRUJILLO (MRN [id number]) as of 11/5/2019 07:07   Ref. Range 10/28/2019 14:48   TSH Latest Ref Range: 0.380 - 5.330 uIU/mL 0.020 (L)   Free T-4 Latest Ref Range: 0.53 - 1.43 ng/dL 0.93   T3 Latest Ref Range: 60.0 - 181.0 ng/dL 133.2   T3,Free Latest Ref Range: 2.40 - 4.20 pg/mL 3.77     Vitamin D Deficiency:  Currently taking Vitamin D 50,000 every 10 days.  Results for ORTIZ TRUJILLO (MRN [id number]) as of 11/5/2019 07:07   Ref. Range 10/28/2019 14:48   25-Hydroxy   Vitamin D 25 Latest Ref Range: 30 - 100 ng/mL 59       ROS:  Constitutional: No unintentional weight loss  Endo: Denies excessive thirst or frequent urination  All other systems were reviewed and were negative.    Past Medical History:  Patient Active Problem List    Diagnosis Date Noted   • Low back pain 11/22/2013   • Radicular pain 11/22/2013   • Anxiety 11/22/2013   • Migraine headache with aura 10/14/2013   • Acute asthma exacerbation 05/13/2013   • Obesity, morbid (more than 100 lbs over ideal weight or BMI > 40) ([facility]) 05/13/2013   • Vitamin D deficiency 07/17/2012   • Eczema 08/17/2009   • Asthma 08/17/2009   • Hiatal hernia 08/17/2009   • Menopause 08/17/2009   • Hypothyroid 08/17/2009   • Personal history of gastric bypass 08/17/2009   • Hypercholesterolemia 08/17/2009       Medications:    Current Outpatient Medications:   •  omeprazole (PRILOSEC) 40 MG delayed-release capsule, Take 40 mg by mouth 2 times a day., Disp: , Rfl:   •  phentermine 15 MG capsule, Take 1 Cap by mouth every morning for 15 days., Disp: 15 Cap, Rfl: 0  •  phentermine  "37.5 MG capsule, Take 1 Cap by mouth every morning for 120 days., Disp: 30 Cap, Rfl: 3  •  raNITidine (ZANTAC) 150 MG Tab, , Disp: , Rfl:   •  liothyronine (CYTOMEL) 25 MCG Tab, TAKE 1 TABLET BY MOUTH EVERY DAY, Disp: 90 Tab, Rfl: 0  •  levothyroxine (SYNTHROID) 200 MCG Tab, Take 1 Tab by mouth every morning. ON A EMPTY STOMACH, Disp: 90 Tab, Rfl: 3  •  vitamin D, Ergocalciferol, (DRISDOL) 35453 units Cap capsule, Take 1 Cap by mouth every 7 days., Disp: 12 Cap, Rfl: 3  •  acetaminophen (TYLENOL) 500 MG Tab, Take 1,000 mg by mouth 1 time daily as needed., Disp: , Rfl:   •  SUMAtriptan (IMITREX) 25 MG Tab tablet, Take 25 mg by mouth Once PRN for Migraine., Disp: , Rfl:   •  clotrimazole-betamethasone (LOTRISONE) 1-0.05 % Cream, Apply 1 Application to affected area(s) 2 times a day as needed. Eczema - 3 weeks on, 1 week off  PT STARTED A WEEK AGO (6/12/2018), Disp: , Rfl:     Labs: Reviewed    Physical Examination:  Vital signs: /80   Pulse 96   Ht 1.664 m (5' 5.5\")   Wt 108.4 kg (239 lb)   SpO2 97%   BMI 39.17 kg/m²  Body mass index is 39.17 kg/m². Patient's body mass index is 39.17 kg/m². Exercise and nutrition counseling were performed at this visit.  General: No apparent distress, cooperative  Eyes: No scleral icterus, no discharge, normal eyelids  Neck: No abnormal masses on inspection, normal thyroid exam  Resp: Normal effort, clear to auscultation bilaterally  CVS: Regular rate and rhythm, S1 S2 normal, no murmur  Extremities: No lower extremity edema  Abdomen: abdominal obesity present  Musculoskeletal: Normal digits and nails  Skin: No rash on visible skin  Psych: Alert and oriented, normal mood and affect, intact memory and able to make informed decisions.    Assessment and Plan:    1. Hypothyroidism due to Hashimoto's thyroiditis  Clinically and biochemically euthyroid.  TSH for her is not reliable.    2. Vitamin D deficiency  continue vitamin D supplementation with food    3. Obesity:  Add " phentermine as adjunct to diet and exercise measures for weight loss.  Patient was on phentermine in the past and has good success.  Side effects and benefits discussed with patient in great detail.    Return in about 3 months (around 2/5/2020).    Thank you for allowing me to participate in the care of this patient.    Héctor Wang M.D.  This note was scribed by Mia Da Silva RN CDE  CC:   Scarlet Kramer M.D.    This note was created using voice recognition software (Dragon). The accuracy of the dictation is limited by the abilities of the software. I have reviewed the note prior to signing, however some errors in grammar and context are still possible. If you have any questions related to this note please do not hesitate to contact our office.

## 2020-03-25 ENCOUNTER — OFFICE VISIT (OUTPATIENT)
Dept: ENDOCRINOLOGY | Facility: MEDICAL CENTER | Age: 55
End: 2020-03-25
Payer: COMMERCIAL

## 2020-03-25 VITALS
HEIGHT: 66 IN | WEIGHT: 232 LBS | DIASTOLIC BLOOD PRESSURE: 72 MMHG | HEART RATE: 126 BPM | BODY MASS INDEX: 37.28 KG/M2 | OXYGEN SATURATION: 94 % | SYSTOLIC BLOOD PRESSURE: 118 MMHG

## 2020-03-25 DIAGNOSIS — E55.9 VITAMIN D DEFICIENCY: ICD-10-CM

## 2020-03-25 DIAGNOSIS — E03.8 HYPOTHYROIDISM DUE TO HASHIMOTO'S THYROIDITIS: ICD-10-CM

## 2020-03-25 DIAGNOSIS — E53.8 VITAMIN B 12 DEFICIENCY: ICD-10-CM

## 2020-03-25 DIAGNOSIS — E06.3 HYPOTHYROIDISM DUE TO HASHIMOTO'S THYROIDITIS: ICD-10-CM

## 2020-03-25 PROCEDURE — 99214 OFFICE O/P EST MOD 30 MIN: CPT | Performed by: INTERNAL MEDICINE

## 2020-03-25 RX ORDER — PHENTERMINE HYDROCHLORIDE 37.5 MG/1
37.5 CAPSULE ORAL EVERY MORNING
Qty: 30 CAP | Refills: 3 | Status: SHIPPED | OUTPATIENT
Start: 2020-03-25 | End: 2020-07-23

## 2020-03-25 ASSESSMENT — FIBROSIS 4 INDEX: FIB4 SCORE: 1.02

## 2020-03-25 NOTE — PROGRESS NOTES
Endocrinology Clinic Progress Note  PCP: Scarlet Kramer M.D.    HPI:  Rosy Garay is a 54 y.o. old patient who comes in today for review of her hypothyroid and Vitamin D deficiency.  She is currently on Levothyroxine 200 mcg per day and Liothyronine 25 mcg per day on empty stomach  Labs on 03/12/2020 show TSH of <0.01 and T4 of 1.6    Currently on Vitamin D  34673 iu every 10 days, Vitamin D level on 03/12/20 was 53    Obesity: Doing really well on phentermine as adjunct to diet and exercise measures for weight loss.       ROS:  Constitutional: weight loss  Cardiac: No palpitations or racing heart  Resp: No shortness of breath  Neuro: No numbness or tinging in feet  Endo: No heat or cold intolerance, no polyuria or polydipsia  All other systems were reviewed and were negative.    Past Medical History:  Patient Active Problem List    Diagnosis Date Noted   • Low back pain 11/22/2013   • Radicular pain 11/22/2013   • Anxiety 11/22/2013   • Migraine headache with aura 10/14/2013   • Acute asthma exacerbation 05/13/2013   • Obesity, morbid (more than 100 lbs over ideal weight or BMI > 40) ([facility]) 05/13/2013   • Vitamin D deficiency 07/17/2012   • Eczema 08/17/2009   • Asthma 08/17/2009   • Hiatal hernia 08/17/2009   • Menopause 08/17/2009   • Hypothyroid 08/17/2009   • Personal history of gastric bypass 08/17/2009   • Hypercholesterolemia 08/17/2009       Past Surgical History:  Past Surgical History:   Procedure Laterality Date   • GASTROSCOPY  8/1/2018    Procedure: GASTROSCOPY- WITH DILATION;  Surgeon: Jamaal Aaron M.D.;  Location: SURGERY [facility];  Service: General   • NISSEN FUNDOPLICATION LAPAROSCOPIC  5/16/2018    Procedure: NISSEN FUNDOPLICATION LAPAROSCOPIC- FOR RECURRENT HIATAL HERNIA;  Surgeon: Jamaal Aaron M.D.;  Location: SURGERY [facility];  Service: General   • OTHER ABDOMINAL SURGERY  2007    revision of gastric pouch   • HERNIA REPAIR  2005    hiatal   • GASTRIC BYPASS  LAPAROSCOPIC     • TUBAL COAGULATION LAPAROSCOPIC BILATERAL     • BREAST BIOPSY      microcalcification   • BREAST BIOPSY      left benign breast biopsy in    • CHOLECYSTECTOMY   or     laparoscopic       Allergies:  Nkda [no known drug allergy]    Social History:  Social History     Socioeconomic History   • Marital status:      Spouse name: Not on file   • Number of children: Not on file   • Years of education: Not on file   • Highest education level: Not on file   Occupational History   • Not on file   Social Needs   • Financial resource strain: Not on file   • Food insecurity     Worry: Not on file     Inability: Not on file   • Transportation needs     Medical: Not on file     Non-medical: Not on file   Tobacco Use   • Smoking status: Former Smoker     Packs/day: 0.25     Years: 10.00     Pack years: 2.50     Types: Cigarettes     Last attempt to quit: 2006     Years since quittin.2   • Smokeless tobacco: Never Used   • Tobacco comment: 5 cigarettes a day   Substance and Sexual Activity   • Alcohol use: No   • Drug use: No   • Sexual activity: Yes     Partners: Male     Birth control/protection: Post-Menopausal     Comment: ,    Lifestyle   • Physical activity     Days per week: Not on file     Minutes per session: Not on file   • Stress: Not on file   Relationships   • Social connections     Talks on phone: Not on file     Gets together: Not on file     Attends Buddhist service: Not on file     Active member of club or organization: Not on file     Attends meetings of clubs or organizations: Not on file     Relationship status: Not on file   • Intimate partner violence     Fear of current or ex partner: Not on file     Emotionally abused: Not on file     Physically abused: Not on file     Forced sexual activity: Not on file   Other Topics Concern   •  Service Not Asked   • Blood Transfusions Not Asked   • Caffeine Concern Not Asked   •  Occupational Exposure Not Asked   • Hobby Hazards Not Asked   • Sleep Concern Not Asked   • Stress Concern Not Asked   • Weight Concern Not Asked   • Special Diet Not Asked   • Back Care Not Asked   • Exercise Yes   • Bike Helmet No   • Seat Belt Yes   • Self-Exams Yes   Social History Narrative   • Not on file       Family History:  Family History   Problem Relation Age of Onset   • Heart Disease Mother         MI   • Hyperlipidemia Mother    • Heart Disease Father         MI x3, widowmaker   • Lung Disease Father         COPD   • Hyperlipidemia Father    • Heart Disease Maternal Grandmother         MI double bypass   • Cancer Maternal Grandmother         breast   • Cancer Maternal Grandfather         colon and bile duct-diagnosed in 60's   • Heart Disease Paternal Grandmother         MI       Medications:    Current Outpatient Medications:   •  phentermine 37.5 MG capsule, Take 1 Cap by mouth every morning for 120 days., Disp: 30 Cap, Rfl: 3  •  omeprazole (PRILOSEC) 40 MG delayed-release capsule, Take 40 mg by mouth 2 times a day., Disp: , Rfl:   •  raNITidine (ZANTAC) 150 MG Tab, , Disp: , Rfl:   •  liothyronine (CYTOMEL) 25 MCG Tab, TAKE 1 TABLET BY MOUTH EVERY DAY, Disp: 90 Tab, Rfl: 0  •  levothyroxine (SYNTHROID) 200 MCG Tab, Take 1 Tab by mouth every morning. ON A EMPTY STOMACH, Disp: 90 Tab, Rfl: 3  •  acetaminophen (TYLENOL) 500 MG Tab, Take 1,000 mg by mouth 1 time daily as needed., Disp: , Rfl:   •  SUMAtriptan (IMITREX) 25 MG Tab tablet, Take 25 mg by mouth Once PRN for Migraine., Disp: , Rfl:   •  vitamin D, Ergocalciferol, (DRISDOL) 23154 units Cap capsule, Take 1 Cap by mouth every 7 days. (Patient taking differently: Take 50,000 Units by mouth every 10 days.), Disp: 12 Cap, Rfl: 3  •  clotrimazole-betamethasone (LOTRISONE) 1-0.05 % Cream, Apply 1 Application to affected area(s) 2 times a day as needed. Eczema - 3 weeks on, 1 week off  PT STARTED A WEEK AGO (6/12/2018), Disp: , Rfl:     Labs:  "Reviewed    Physical Examination:  Vital signs: /72   Pulse (!) 126   Ht 1.676 m (5' 6\")   Wt 105.2 kg (232 lb)   SpO2 94%   BMI 37.45 kg/m²  Body mass index is 37.45 kg/m².  General: No apparent distress, cooperative  Eyes: No scleral icterus or discharge  ENMT: Normal on external inspection of nose, lips, normal thyroid exam  Neck: No abnormal masses on inspection  Resp: Normal effort, clear to auscultation bilaterally   CVS: Regular rate and rhythm, S1 S2 normal, no murmur   Extremities: No edema  Abdomen: abdominal obesity present  Neuro: Alert and oriented  Skin: No rash  Psych: Normal mood and affect, intact memory and able to make informed decisions      Assessment and Plan:  1. Hypothyroidism due to Hashimoto's thyroiditis  Continue current dosages of levothyroxine and liothyronine    2. Vitamin D deficiency  Vitamin D with food as per HPI.    3. Obesity:   Has lost about close to 12 to 16 pounds with the help of phentermine as adjunct to diet and exercise measures for weight loss.    Return in about 6 months (around 9/25/2020).    Thank you for allowing me to participate in the care of this patient.    Héctor Wang M.D.  03/25/20    CC:   Scarlet Kramer M.D.    This note was created using voice recognition software (Dragon). The accuracy of the dictation is limited by the abilities of the software. I have reviewed the note prior to signing, however some errors in grammar and context are still possible. If you have any questions related to this note please do not hesitate to contact our office.   "

## 2020-09-15 ENCOUNTER — HOSPITAL ENCOUNTER (OUTPATIENT)
Dept: LAB | Facility: MEDICAL CENTER | Age: 55
End: 2020-09-15
Attending: INTERNAL MEDICINE
Payer: COMMERCIAL

## 2020-09-15 DIAGNOSIS — E55.9 VITAMIN D DEFICIENCY: ICD-10-CM

## 2020-09-15 DIAGNOSIS — E53.8 VITAMIN B 12 DEFICIENCY: ICD-10-CM

## 2020-09-15 DIAGNOSIS — E03.8 HYPOTHYROIDISM DUE TO HASHIMOTO'S THYROIDITIS: ICD-10-CM

## 2020-09-15 DIAGNOSIS — E06.3 HYPOTHYROIDISM DUE TO HASHIMOTO'S THYROIDITIS: ICD-10-CM

## 2020-09-15 LAB
25(OH)D3 SERPL-MCNC: 40 NG/ML (ref 30–100)
T3FREE SERPL-MCNC: 5.01 PG/ML (ref 2–4.4)
T4 FREE SERPL-MCNC: 1.3 NG/DL (ref 0.93–1.7)
TSH SERPL DL<=0.005 MIU/L-ACNC: 0.08 UIU/ML (ref 0.38–5.33)
VIT B12 SERPL-MCNC: 418 PG/ML (ref 211–911)

## 2020-09-15 PROCEDURE — 84439 ASSAY OF FREE THYROXINE: CPT

## 2020-09-15 PROCEDURE — 82607 VITAMIN B-12: CPT

## 2020-09-15 PROCEDURE — 84481 FREE ASSAY (FT-3): CPT

## 2020-09-15 PROCEDURE — 82306 VITAMIN D 25 HYDROXY: CPT

## 2020-09-15 PROCEDURE — 84443 ASSAY THYROID STIM HORMONE: CPT

## 2020-09-15 PROCEDURE — 36415 COLL VENOUS BLD VENIPUNCTURE: CPT

## 2020-09-23 ENCOUNTER — OFFICE VISIT (OUTPATIENT)
Dept: ENDOCRINOLOGY | Facility: MEDICAL CENTER | Age: 55
End: 2020-09-23
Attending: INTERNAL MEDICINE
Payer: COMMERCIAL

## 2020-09-23 VITALS
OXYGEN SATURATION: 95 % | DIASTOLIC BLOOD PRESSURE: 72 MMHG | HEART RATE: 102 BPM | HEIGHT: 66 IN | SYSTOLIC BLOOD PRESSURE: 98 MMHG | WEIGHT: 225 LBS | BODY MASS INDEX: 36.16 KG/M2

## 2020-09-23 DIAGNOSIS — E06.3 HYPOTHYROIDISM DUE TO HASHIMOTO'S THYROIDITIS: ICD-10-CM

## 2020-09-23 DIAGNOSIS — E03.8 HYPOTHYROIDISM DUE TO HASHIMOTO'S THYROIDITIS: ICD-10-CM

## 2020-09-23 DIAGNOSIS — E53.8 VITAMIN B 12 DEFICIENCY: ICD-10-CM

## 2020-09-23 DIAGNOSIS — E55.9 VITAMIN D DEFICIENCY: ICD-10-CM

## 2020-09-23 DIAGNOSIS — E66.09 CLASS 2 OBESITY DUE TO EXCESS CALORIES WITH BODY MASS INDEX (BMI) OF 36.0 TO 36.9 IN ADULT, UNSPECIFIED WHETHER SERIOUS COMORBIDITY PRESENT: ICD-10-CM

## 2020-09-23 PROCEDURE — 99211 OFF/OP EST MAY X REQ PHY/QHP: CPT | Performed by: INTERNAL MEDICINE

## 2020-09-23 PROCEDURE — 99214 OFFICE O/P EST MOD 30 MIN: CPT | Performed by: INTERNAL MEDICINE

## 2020-09-23 RX ORDER — CIMETIDINE 300 MG/1
TABLET, FILM COATED ORAL
COMMUNITY
Start: 2020-07-22

## 2020-09-23 RX ORDER — TRAMADOL HYDROCHLORIDE 50 MG/1
TABLET ORAL
COMMUNITY
Start: 2020-09-13

## 2020-09-23 RX ORDER — PHENTERMINE HYDROCHLORIDE 30 MG/1
30 CAPSULE ORAL EVERY MORNING
COMMUNITY
End: 2020-09-23 | Stop reason: SDUPTHER

## 2020-09-23 RX ORDER — PHENTERMINE HYDROCHLORIDE 30 MG/1
30 CAPSULE ORAL EVERY MORNING
Qty: 30 CAP | Refills: 3 | Status: SHIPPED | OUTPATIENT
Start: 2020-09-23 | End: 2021-01-21

## 2020-09-23 ASSESSMENT — FIBROSIS 4 INDEX: FIB4 SCORE: 1.04

## 2020-09-23 NOTE — PROGRESS NOTES
"Endocrinology Clinic Progress Note  PCP: Godfrey Naranjo M.D.    HPI:  Rosy Garay is a 55 y.o. old patient who is seen today for review of her hypothyroid and Vitamin D deficiency.     Rosy is currently on Liothyronine 25 mcg per day and Levothyroxine 200 mcg per day, first thing in the morning on empty stomach.      Ref. Range 9/15/2020 15:12   TSH Latest Ref Range: 0.380 - 5.330 uIU/mL 0.083 (L)   Free T-4 Latest Ref Range: 0.93 - 1.70 ng/dL 1.30   T3,Free Latest Ref Range: 2.00 - 4.40 pg/mL 5.01 (H)     Rosy is taking Vitamin D supplementation of 56446 iu every 10 days.       Ref. Range 9/15/2020 15:12   25-Hydroxy   Vitamin D 25 Latest Ref Range: 30 - 100 ng/mL 40     Obesity: taking Phentermine 37.5 mg daily.  History of gastric sleeve surgery.      ROS:  Constitutional: weight loss  Sleep: insomnia  Cardiac: No palpitations or racing heart  Resp: No shortness of breath  Neuro: No numbness or tinging in feet  Endo: No heat or cold intolerance, no polyuria or polydipsia  All other systems were reviewed and were negative.      Labs: Reviewed    Physical Examination:  Vital signs: BP (!) 98/72 (BP Location: Left arm, Patient Position: Sitting, BP Cuff Size: Adult)   Pulse (!) 102   Ht 1.676 m (5' 6\")   Wt 102.1 kg (225 lb)   SpO2 95%   BMI 36.32 kg/m²  Body mass index is 36.32 kg/m².  General: No apparent distress, cooperative  Eyes: No scleral icterus or discharge  ENMT: Normal on external inspection of nose, lips  Neck: No abnormal masses on inspection  Resp: Normal effort, clear to auscultation bilaterally   CVS: Regular rate and rhythm, S1 S2 normal, no murmur   Extremities: No edema  Abdomen: abdominal obesity present  Neuro: Alert and oriented  Skin: No rash  Psych: Normal mood and affect, intact memory and able to make informed decisions    Assessment and Plan:  1. Hypothyroidism due to Hashimoto's thyroiditis  Reduce liothyoronine to 12.5 mg daily.   Continue same dose of " levothyroxine.     2. Vitamin D deficiency  Recommend  To continue vit D 50,000 units once every 10 days with food. Script sent to pharmacy.   Side effects and benefits discussed with patient in detail.   Discussed immune function of vit D and its role in some protection against COVID-19 as per recent early evidence.    3. Obesity  Continue phentermine as adjunct to diet and exercise measures for weight loss.     4. Vit b 12 def:   Recommend over the counter sublingual B 12 - 5950-5396 mcg daily.   Side effects and benefits discussed with patient in detail.       Return in about 3 months (around 12/23/2020).    Thank you for allowing me to participate in the care of this patient.    Héctor Wang M.D.  09/23/20    CC:   Godfrey Naranjo M.D.    This note was created using voice recognition software (Dragon). The accuracy of the dictation is limited by the abilities of the software. I have reviewed the note prior to signing, however some errors in grammar and context are still possible. If you have any questions related to this note please do not hesitate to contact our office.

## 2020-11-16 DIAGNOSIS — E55.9 VITAMIN D DEFICIENCY: ICD-10-CM

## 2020-11-16 DIAGNOSIS — E06.3 HYPOTHYROIDISM DUE TO HASHIMOTO'S THYROIDITIS: ICD-10-CM

## 2020-11-16 DIAGNOSIS — E03.8 HYPOTHYROIDISM DUE TO HASHIMOTO'S THYROIDITIS: ICD-10-CM

## 2020-11-17 RX ORDER — LEVOTHYROXINE SODIUM 0.2 MG/1
200 TABLET ORAL EVERY MORNING
Qty: 90 TAB | Refills: 3 | Status: SHIPPED | OUTPATIENT
Start: 2020-11-17

## 2020-11-17 RX ORDER — ERGOCALCIFEROL 1.25 MG/1
50000 CAPSULE ORAL
Qty: 12 CAP | Refills: 3 | Status: SHIPPED | OUTPATIENT
Start: 2020-11-17 | End: 2021-03-19 | Stop reason: SDUPTHER

## 2020-11-17 RX ORDER — LIOTHYRONINE SODIUM 25 UG/1
25 TABLET ORAL
Qty: 90 TAB | Refills: 0 | Status: SHIPPED | OUTPATIENT
Start: 2020-11-17 | End: 2020-12-23

## 2020-12-13 DIAGNOSIS — E53.8 VITAMIN B 12 DEFICIENCY: ICD-10-CM

## 2020-12-13 DIAGNOSIS — E66.09 CLASS 2 OBESITY DUE TO EXCESS CALORIES WITH BODY MASS INDEX (BMI) OF 36.0 TO 36.9 IN ADULT, UNSPECIFIED WHETHER SERIOUS COMORBIDITY PRESENT: ICD-10-CM

## 2020-12-13 DIAGNOSIS — E06.3 HYPOTHYROIDISM DUE TO HASHIMOTO'S THYROIDITIS: ICD-10-CM

## 2020-12-13 DIAGNOSIS — E55.9 VITAMIN D DEFICIENCY: ICD-10-CM

## 2020-12-13 DIAGNOSIS — E03.8 HYPOTHYROIDISM DUE TO HASHIMOTO'S THYROIDITIS: ICD-10-CM

## 2020-12-13 DIAGNOSIS — R73.9 HYPERGLYCEMIA: ICD-10-CM

## 2020-12-15 ENCOUNTER — HOSPITAL ENCOUNTER (OUTPATIENT)
Dept: LAB | Facility: MEDICAL CENTER | Age: 55
End: 2020-12-15
Attending: INTERNAL MEDICINE
Payer: COMMERCIAL

## 2020-12-15 DIAGNOSIS — E03.8 HYPOTHYROIDISM DUE TO HASHIMOTO'S THYROIDITIS: ICD-10-CM

## 2020-12-15 DIAGNOSIS — E55.9 VITAMIN D DEFICIENCY: ICD-10-CM

## 2020-12-15 DIAGNOSIS — R73.9 HYPERGLYCEMIA: ICD-10-CM

## 2020-12-15 DIAGNOSIS — E06.3 HYPOTHYROIDISM DUE TO HASHIMOTO'S THYROIDITIS: ICD-10-CM

## 2020-12-15 DIAGNOSIS — E53.8 VITAMIN B 12 DEFICIENCY: ICD-10-CM

## 2020-12-15 DIAGNOSIS — E66.09 CLASS 2 OBESITY DUE TO EXCESS CALORIES WITH BODY MASS INDEX (BMI) OF 36.0 TO 36.9 IN ADULT, UNSPECIFIED WHETHER SERIOUS COMORBIDITY PRESENT: ICD-10-CM

## 2020-12-15 LAB
25(OH)D3 SERPL-MCNC: 39 NG/ML (ref 30–100)
ALBUMIN SERPL BCP-MCNC: 4.3 G/DL (ref 3.2–4.9)
ALBUMIN/GLOB SERPL: 1.6 G/DL
ALP SERPL-CCNC: 70 U/L (ref 30–99)
ALT SERPL-CCNC: 27 U/L (ref 2–50)
ANION GAP SERPL CALC-SCNC: 9 MMOL/L (ref 7–16)
AST SERPL-CCNC: 19 U/L (ref 12–45)
BASOPHILS # BLD AUTO: 1.4 % (ref 0–1.8)
BASOPHILS # BLD: 0.1 K/UL (ref 0–0.12)
BILIRUB SERPL-MCNC: 0.4 MG/DL (ref 0.1–1.5)
BUN SERPL-MCNC: 16 MG/DL (ref 8–22)
CALCIUM SERPL-MCNC: 9.2 MG/DL (ref 8.5–10.5)
CHLORIDE SERPL-SCNC: 104 MMOL/L (ref 96–112)
CHOLEST SERPL-MCNC: 172 MG/DL (ref 100–199)
CO2 SERPL-SCNC: 27 MMOL/L (ref 20–33)
CREAT SERPL-MCNC: 0.81 MG/DL (ref 0.5–1.4)
EOSINOPHIL # BLD AUTO: 0.71 K/UL (ref 0–0.51)
EOSINOPHIL NFR BLD: 10.1 % (ref 0–6.9)
ERYTHROCYTE [DISTWIDTH] IN BLOOD BY AUTOMATED COUNT: 39.5 FL (ref 35.9–50)
EST. AVERAGE GLUCOSE BLD GHB EST-MCNC: 117 MG/DL
FASTING STATUS PATIENT QL REPORTED: NORMAL
GLOBULIN SER CALC-MCNC: 2.7 G/DL (ref 1.9–3.5)
GLUCOSE SERPL-MCNC: 96 MG/DL (ref 65–99)
HBA1C MFR BLD: 5.7 % (ref 0–5.6)
HCT VFR BLD AUTO: 47.8 % (ref 37–47)
HDLC SERPL-MCNC: 56 MG/DL
HGB BLD-MCNC: 15.6 G/DL (ref 12–16)
IMM GRANULOCYTES # BLD AUTO: 0.03 K/UL (ref 0–0.11)
IMM GRANULOCYTES NFR BLD AUTO: 0.4 % (ref 0–0.9)
LDLC SERPL CALC-MCNC: 95 MG/DL
LYMPHOCYTES # BLD AUTO: 2.56 K/UL (ref 1–4.8)
LYMPHOCYTES NFR BLD: 36.4 % (ref 22–41)
MCH RBC QN AUTO: 30.2 PG (ref 27–33)
MCHC RBC AUTO-ENTMCNC: 32.6 G/DL (ref 33.6–35)
MCV RBC AUTO: 92.6 FL (ref 81.4–97.8)
MONOCYTES # BLD AUTO: 0.43 K/UL (ref 0–0.85)
MONOCYTES NFR BLD AUTO: 6.1 % (ref 0–13.4)
NEUTROPHILS # BLD AUTO: 3.2 K/UL (ref 2–7.15)
NEUTROPHILS NFR BLD: 45.6 % (ref 44–72)
NRBC # BLD AUTO: 0 K/UL
NRBC BLD-RTO: 0 /100 WBC
PLATELET # BLD AUTO: 285 K/UL (ref 164–446)
PMV BLD AUTO: 10.9 FL (ref 9–12.9)
POTASSIUM SERPL-SCNC: 4.3 MMOL/L (ref 3.6–5.5)
PROT SERPL-MCNC: 7 G/DL (ref 6–8.2)
RBC # BLD AUTO: 5.16 M/UL (ref 4.2–5.4)
SODIUM SERPL-SCNC: 140 MMOL/L (ref 135–145)
T3FREE SERPL-MCNC: 3.04 PG/ML (ref 2–4.4)
T4 FREE SERPL-MCNC: 1.32 NG/DL (ref 0.93–1.7)
TRIGL SERPL-MCNC: 106 MG/DL (ref 0–149)
TSH SERPL DL<=0.005 MIU/L-ACNC: 0.08 UIU/ML (ref 0.38–5.33)
VIT B12 SERPL-MCNC: 449 PG/ML (ref 211–911)
WBC # BLD AUTO: 7 K/UL (ref 4.8–10.8)

## 2020-12-15 PROCEDURE — 82607 VITAMIN B-12: CPT

## 2020-12-15 PROCEDURE — 84481 FREE ASSAY (FT-3): CPT

## 2020-12-15 PROCEDURE — 84439 ASSAY OF FREE THYROXINE: CPT

## 2020-12-15 PROCEDURE — 83036 HEMOGLOBIN GLYCOSYLATED A1C: CPT

## 2020-12-15 PROCEDURE — 82306 VITAMIN D 25 HYDROXY: CPT

## 2020-12-15 PROCEDURE — 85025 COMPLETE CBC W/AUTO DIFF WBC: CPT

## 2020-12-15 PROCEDURE — 36415 COLL VENOUS BLD VENIPUNCTURE: CPT

## 2020-12-15 PROCEDURE — 80061 LIPID PANEL: CPT

## 2020-12-15 PROCEDURE — 80053 COMPREHEN METABOLIC PANEL: CPT

## 2020-12-15 PROCEDURE — 84443 ASSAY THYROID STIM HORMONE: CPT

## 2020-12-23 ENCOUNTER — TELEMEDICINE (OUTPATIENT)
Dept: ENDOCRINOLOGY | Facility: MEDICAL CENTER | Age: 55
End: 2020-12-23
Attending: INTERNAL MEDICINE
Payer: COMMERCIAL

## 2020-12-23 DIAGNOSIS — E55.9 VITAMIN D DEFICIENCY: ICD-10-CM

## 2020-12-23 DIAGNOSIS — R73.03 PREDIABETES: ICD-10-CM

## 2020-12-23 DIAGNOSIS — E03.8 HYPOTHYROIDISM DUE TO HASHIMOTO'S THYROIDITIS: ICD-10-CM

## 2020-12-23 DIAGNOSIS — E06.3 HYPOTHYROIDISM DUE TO HASHIMOTO'S THYROIDITIS: ICD-10-CM

## 2020-12-23 PROCEDURE — 99214 OFFICE O/P EST MOD 30 MIN: CPT | Mod: 95,CR | Performed by: INTERNAL MEDICINE

## 2020-12-23 RX ORDER — LIOTHYRONINE SODIUM 5 UG/1
10 TABLET ORAL DAILY
Qty: 90 TAB | Refills: 2 | Status: SHIPPED | OUTPATIENT
Start: 2020-12-23 | End: 2021-01-22

## 2020-12-23 NOTE — PROGRESS NOTES
Chief Complaint: Follow up for Primary Hypothyroidism, vitamin D deficiency, prediabetes    Patient was presented for a telehealth consultation via secure and encrypted videoconferencing technology. This encounter was conducted via Zoom . Verbal consent was obtained. Patient's identity was verified.    HPI:     Rosy Garay is a 55 y.o. female here for follow up of Primary Hypothyroidism, vitamin D deficiency, prediabetes    She reports excellent compliance and denies missing any daily doses.  She takes thyroid hormone prior to breakfast. She denies taking any iron, calcium supplements or antacids.      Weight has been stable. She currently reports anxiousness, palpitations, and feeling chronically tired/fatigue more recently as well.      On levothyroxine 200 MCG daily for 5 days, and liothyronine 12.5 MCG daily for 5 days.  Most recent thyroid labs from 12/15/2020-TSH 0.082, free T4 1.32, free T3 3.04.  Previous labs TSH was 0.08, free T4 1.3, free T3 5.    Prediabetes-A1c from 12/15/2020 was 5.7%.    Vit D def- Most recent level is 39 from 12/15/20, on 74505 units every 10 days.     Patient's medications, allergies, and social histories were reviewed and updated as appropriate.    ROS:     CONS:     No fever, no chills   EYES:     No diplopia, no blurry vision   CV:           No chest pain, no palpitations   PULM:     No SOB, no cough, no hemoptysis.   GI:            No nausea, no vomiting, no diarrhea, no constipation   ENDO:     No polyuria, no polydipsia, no heat intolerance, no cold intolerance       Past Medical History:  Problem List:  2013-11: Low back pain  2013-11: Radicular pain  2013-11: Anxiety  2013-10: Migraine headache with aura  2013-05: Acute asthma exacerbation  2013-05: Obesity, morbid (more than 100 lbs over ideal weight or BMI   > 40) ([facility])  2012-07: Vitamin D deficiency  2009-08: Eczema  2009-08: Asthma  2009-08: Hiatal hernia  2009-08: Menopause  2009-08: Hypothyroid  2009-08:  Personal history of gastric bypass  2009: Hypercholesterolemia      Past Surgical History:  Past Surgical History:   Procedure Laterality Date   • GASTROSCOPY  2018    Procedure: GASTROSCOPY- WITH DILATION;  Surgeon: Jamaal Aaron M.D.;  Location: SURGERY [facility];  Service: General   • NISSEN FUNDOPLICATION LAPAROSCOPIC  2018    Procedure: NISSEN FUNDOPLICATION LAPAROSCOPIC- FOR RECURRENT HIATAL HERNIA;  Surgeon: Jamaal Aaron M.D.;  Location: SURGERY [facility];  Service: General   • OTHER ABDOMINAL SURGERY      revision of gastric pouch   • HERNIA REPAIR      hiatal   • GASTRIC BYPASS LAPAROSCOPIC     • TUBAL COAGULATION LAPAROSCOPIC BILATERAL     • BREAST BIOPSY      microcalcification   • BREAST BIOPSY      left benign breast biopsy in    • CHOLECYSTECTOMY   or     laparoscopic        Allergies:  Nkda [no known drug allergy]     Social History:  Social History     Tobacco Use   • Smoking status: Former Smoker     Packs/day: 0.25     Years: 10.00     Pack years: 2.50     Types: Cigarettes     Quit date: 2006     Years since quittin.9   • Smokeless tobacco: Never Used   • Tobacco comment: 5 cigarettes a day   Substance Use Topics   • Alcohol use: No   • Drug use: No        Family History:   family history includes Cancer in her maternal grandfather and maternal grandmother; Heart Disease in her father, maternal grandmother, mother, and paternal grandmother; Hyperlipidemia in her father and mother; Lung Disease in her father.      PHYSICAL EXAM:     PHYSICAL EXAM:   Vital signs: There were no vitals taken for this visit.  GENERAL: Well-developed, well-nourished in no apparent distress.   EYE:  No ocular asymmetry, conjunctiva appear normal  HENT: Atraumatic, normocephalic, normal facies  NECK:  No neck masses or thyromegaly visualized  LUNGS:  Appears to be breathing normally, no signs of respiratory distress  EXTREMITIES:  Normal range of motion  visualized  NEUROLOGICAL: No gross focal motor abnormalities visualized  LYMPH: No cervical adenopathy visualized  SKIN: No rashes, lesions visualized within limits of telemedicine visit      Labs:  Lab Results   Component Value Date/Time    SODIUM 140 12/15/2020 09:09 AM    POTASSIUM 4.3 12/15/2020 09:09 AM    CHLORIDE 104 12/15/2020 09:09 AM    CO2 27 12/15/2020 09:09 AM    ANION 9.0 12/15/2020 09:09 AM    GLUCOSE 96 12/15/2020 09:09 AM    BUN 16 12/15/2020 09:09 AM    CREATININE 0.81 12/15/2020 09:09 AM    CREATININE 0.85 08/22/2009 10:50 AM    CALCIUM 9.2 12/15/2020 09:09 AM    ASTSGOT 19 12/15/2020 09:09 AM    ALTSGPT 27 12/15/2020 09:09 AM    TBILIRUBIN 0.4 12/15/2020 09:09 AM    ALBUMIN 4.3 12/15/2020 09:09 AM    TOTPROTEIN 7.0 12/15/2020 09:09 AM    GLOBULIN 2.7 12/15/2020 09:09 AM    AGRATIO 1.6 12/15/2020 09:09 AM       Lab Results   Component Value Date/Time    SODIUM 140 12/15/2020 0909    POTASSIUM 4.3 12/15/2020 0909    CHLORIDE 104 12/15/2020 0909    CO2 27 12/15/2020 0909    GLUCOSE 96 12/15/2020 0909    BUN 16 12/15/2020 0909    CREATININE 0.81 12/15/2020 0909    CALCIUM 9.2 12/15/2020 0909    ANION 9.0 12/15/2020 0909       Lab Results   Component Value Date/Time    CHOLSTRLTOT 172 12/15/2020 0909    TRIGLYCERIDE 106 12/15/2020 0909    HDL 56 12/15/2020 0909    LDL 95 12/15/2020 0909       Lab Results   Component Value Date/Time    TSHULTRASEN 0.082 (L) 12/15/2020 0909     Lab Results   Component Value Date/Time    FREET4 1.32 12/15/2020 0909     Lab Results   Component Value Date/Time    FREET3 3.04 12/15/2020 0909     No results found for: THYSTIMIG    Lab Results   Component Value Date/Time    MICROSOMALA 26.6 (H) 07/29/2015 1547         Imaging:      ASSESSMENT/PLAN:     1. Hypothyroidism due to Hashimoto's thyroiditis  On levothyroxine 200 MCG daily for 5 days, and liothyronine 12.5 MCG daily for 5 days.  Most recent thyroid labs from 12/15/2020-TSH 0.082, free T4 1.32, free T3 3.04.  Previous  labs TSH was 0.08, free T4 1.3, free T3 5.  - At this time her recent lab work suggest that she is on too much thyroid hormone and she is also experiencing symptoms such as anxiety palpitations and feeling fatigued and tired therefore we need to reduce her thyroid hormone intake  - Continue levothyroxine 200 MCG daily, 5 days of the week  - Reduce liothyronine from 12.5 to 10 MCG daily, 5 days of the week, new prescription for liothyronine 10 MCG daily sent to pharmacy  - Thyroid labs prior to 3-month follow-up  - Follow-up with Mia or Marilu in 3 months    2. Vitamin D deficiency  Most recent level is 39 from 12/15/20, on 42353 units every 10 days.   - Continue vitamin D supplementation 50,000 units every 10 days  - Vitamin D level prior to 3-month follow-up    3. Prediabetes  A1c from 12/15/2020 was 5.7%.  - We will continue to monitor, will obtain A1c prior to 3-month follow-up    - Comp Metabolic Panel; Future  - FREE THYROXINE; Future  - T3 FREE; Future  - TRIIDOTHYRONINE; Future  - TSH; Future  - VITAMIN B12; Future  - VITAMIN D,25 HYDROXY; Future  - Lipid Profile; Future  - HEMOGLOBIN A1C; Future    No follow-ups on file.    This patient during there office visit today was started on a new medication.  Side effects of the new medication were discussed with the patient today in the office.     Thank you kindly for allowing me to participate in the thyroid care plan for this patient.    Cy Galan MD, [facility], [facility]  12/23/20    CC:   Godfrey Naranjo M.D.

## 2021-02-19 ENCOUNTER — HOSPITAL ENCOUNTER (OUTPATIENT)
Dept: LAB | Facility: MEDICAL CENTER | Age: 56
End: 2021-02-19
Attending: NURSE PRACTITIONER
Payer: COMMERCIAL

## 2021-02-19 LAB
25(OH)D3 SERPL-MCNC: 40 NG/ML (ref 30–100)
ALBUMIN SERPL BCP-MCNC: 4.3 G/DL (ref 3.2–4.9)
ALBUMIN/GLOB SERPL: 1.7 G/DL
ALP SERPL-CCNC: 68 U/L (ref 30–99)
ALT SERPL-CCNC: 28 U/L (ref 2–50)
ANION GAP SERPL CALC-SCNC: 11 MMOL/L (ref 7–16)
AST SERPL-CCNC: 28 U/L (ref 12–45)
BASOPHILS # BLD AUTO: 1.2 % (ref 0–1.8)
BASOPHILS # BLD: 0.09 K/UL (ref 0–0.12)
BILIRUB SERPL-MCNC: 0.2 MG/DL (ref 0.1–1.5)
BUN SERPL-MCNC: 12 MG/DL (ref 8–22)
CALCIUM SERPL-MCNC: 9.4 MG/DL (ref 8.5–10.5)
CHLORIDE SERPL-SCNC: 103 MMOL/L (ref 96–112)
CO2 SERPL-SCNC: 23 MMOL/L (ref 20–33)
CREAT SERPL-MCNC: 0.82 MG/DL (ref 0.5–1.4)
EOSINOPHIL # BLD AUTO: 0.73 K/UL (ref 0–0.51)
EOSINOPHIL NFR BLD: 9.4 % (ref 0–6.9)
ERYTHROCYTE [DISTWIDTH] IN BLOOD BY AUTOMATED COUNT: 42.7 FL (ref 35.9–50)
FOLATE SERPL-MCNC: 9 NG/ML
GLOBULIN SER CALC-MCNC: 2.6 G/DL (ref 1.9–3.5)
GLUCOSE SERPL-MCNC: 91 MG/DL (ref 65–99)
HCT VFR BLD AUTO: 45.7 % (ref 37–47)
HGB BLD-MCNC: 14.8 G/DL (ref 12–16)
IMM GRANULOCYTES # BLD AUTO: 0.03 K/UL (ref 0–0.11)
IMM GRANULOCYTES NFR BLD AUTO: 0.4 % (ref 0–0.9)
LYMPHOCYTES # BLD AUTO: 3.13 K/UL (ref 1–4.8)
LYMPHOCYTES NFR BLD: 40.3 % (ref 22–41)
MCH RBC QN AUTO: 29.8 PG (ref 27–33)
MCHC RBC AUTO-ENTMCNC: 32.4 G/DL (ref 33.6–35)
MCV RBC AUTO: 92.1 FL (ref 81.4–97.8)
MONOCYTES # BLD AUTO: 0.56 K/UL (ref 0–0.85)
MONOCYTES NFR BLD AUTO: 7.2 % (ref 0–13.4)
NEUTROPHILS # BLD AUTO: 3.22 K/UL (ref 2–7.15)
NEUTROPHILS NFR BLD: 41.5 % (ref 44–72)
NRBC # BLD AUTO: 0 K/UL
NRBC BLD-RTO: 0 /100 WBC
PLATELET # BLD AUTO: 284 K/UL (ref 164–446)
PMV BLD AUTO: 11.7 FL (ref 9–12.9)
POTASSIUM SERPL-SCNC: 4.4 MMOL/L (ref 3.6–5.5)
PROT SERPL-MCNC: 6.9 G/DL (ref 6–8.2)
RBC # BLD AUTO: 4.96 M/UL (ref 4.2–5.4)
RHEUMATOID FACT SER IA-ACNC: <10 IU/ML (ref 0–14)
SODIUM SERPL-SCNC: 137 MMOL/L (ref 135–145)
T4 FREE SERPL-MCNC: 1.45 NG/DL (ref 0.93–1.7)
T4 SERPL-MCNC: 8.7 UG/DL (ref 4–12)
TSH SERPL DL<=0.005 MIU/L-ACNC: 0.05 UIU/ML (ref 0.38–5.33)
URATE SERPL-MCNC: 7.3 MG/DL (ref 1.9–8.2)
VIT B12 SERPL-MCNC: 330 PG/ML (ref 211–911)
WBC # BLD AUTO: 7.8 K/UL (ref 4.8–10.8)

## 2021-02-19 PROCEDURE — 84550 ASSAY OF BLOOD/URIC ACID: CPT

## 2021-02-19 PROCEDURE — 84443 ASSAY THYROID STIM HORMONE: CPT

## 2021-02-19 PROCEDURE — 36415 COLL VENOUS BLD VENIPUNCTURE: CPT

## 2021-02-19 PROCEDURE — 82607 VITAMIN B-12: CPT

## 2021-02-19 PROCEDURE — 82306 VITAMIN D 25 HYDROXY: CPT

## 2021-02-19 PROCEDURE — 86431 RHEUMATOID FACTOR QUANT: CPT

## 2021-02-19 PROCEDURE — 85652 RBC SED RATE AUTOMATED: CPT

## 2021-02-19 PROCEDURE — 82746 ASSAY OF FOLIC ACID SERUM: CPT

## 2021-02-19 PROCEDURE — 85025 COMPLETE CBC W/AUTO DIFF WBC: CPT

## 2021-02-19 PROCEDURE — 80053 COMPREHEN METABOLIC PANEL: CPT

## 2021-02-19 PROCEDURE — 86038 ANTINUCLEAR ANTIBODIES: CPT

## 2021-02-19 PROCEDURE — 84439 ASSAY OF FREE THYROXINE: CPT

## 2021-02-20 LAB — ERYTHROCYTE [SEDIMENTATION RATE] IN BLOOD BY WESTERGREN METHOD: 3 MM/HOUR (ref 0–30)

## 2021-02-21 LAB — NUCLEAR IGG SER QL IA: NORMAL

## 2021-03-15 DIAGNOSIS — Z23 NEED FOR VACCINATION: ICD-10-CM

## 2021-03-19 ENCOUNTER — OFFICE VISIT (OUTPATIENT)
Dept: ENDOCRINOLOGY | Facility: MEDICAL CENTER | Age: 56
End: 2021-03-19
Attending: NURSE PRACTITIONER
Payer: COMMERCIAL

## 2021-03-19 VITALS
DIASTOLIC BLOOD PRESSURE: 70 MMHG | HEIGHT: 66 IN | OXYGEN SATURATION: 96 % | HEART RATE: 108 BPM | SYSTOLIC BLOOD PRESSURE: 118 MMHG | BODY MASS INDEX: 40.31 KG/M2 | WEIGHT: 250.8 LBS

## 2021-03-19 DIAGNOSIS — R73.03 PREDIABETES: ICD-10-CM

## 2021-03-19 DIAGNOSIS — E55.9 VITAMIN D DEFICIENCY: ICD-10-CM

## 2021-03-19 DIAGNOSIS — E03.8 HYPOTHYROIDISM DUE TO HASHIMOTO'S THYROIDITIS: ICD-10-CM

## 2021-03-19 DIAGNOSIS — E06.3 HYPOTHYROIDISM DUE TO HASHIMOTO'S THYROIDITIS: ICD-10-CM

## 2021-03-19 PROCEDURE — 99214 OFFICE O/P EST MOD 30 MIN: CPT | Performed by: NURSE PRACTITIONER

## 2021-03-19 PROCEDURE — 99211 OFF/OP EST MAY X REQ PHY/QHP: CPT | Performed by: NURSE PRACTITIONER

## 2021-03-19 RX ORDER — ERGOCALCIFEROL 1.25 MG/1
50000 CAPSULE ORAL
Qty: 12 CAPSULE | Refills: 3 | Status: SHIPPED | OUTPATIENT
Start: 2021-03-19

## 2021-03-19 ASSESSMENT — FIBROSIS 4 INDEX: FIB4 SCORE: 1.04

## 2021-03-19 NOTE — PROGRESS NOTES
Chief Complaint: Follow up for Primary Hypothyroidism, vitamin D deficiency and prediabetes.  Previously seen by Dr. Wang with last appointment on 12/23/2020 with Dr. William.    HPI:     Rosy Garay is a 55 y.o. female here for follow up of Primary Hypothyroidism-Hashimoto's.  Since last visit patient reports feeling well.  She remains on levothyroxine 200 mg daily and Cytomel 10 mcg daily which has been   her dosage since December 2020.  Patient was instructed in December 2020 to take both of these medications 5 days/week.  Patient states she does not remember this instruction.  She reports adequate compliance and denies missing any daily doses.  Patient takes thyroid hormone on an empty stomach 1 hour before 6} breakfast.  Patient denies taking any iron, calcium supplements or antacids.      Weight increased 25 pounds since September 2020.    Patient denies cold intolerance, constipation and mental fogginess.  Patient has had increased anxiousness and difficulty sleeping through the night.  Denies palpitations and heat intolerance at this time.    Patient having difficulty since her gastric bypass with hiatal hernia repair several years ago.  Patient has moderate to severe acid reflux in which she has to sleep upright, and even this does not assist in decreasing the level of reflux that she has.  Patient's PCP Charisse Martinez is coordinating a referral outside of [facility] to a specialist in [facility] secondary to these ongoing issues.      Ref. Range 2/19/2021 14:44   TSH Latest Ref Range: 0.380 - 5.330 uIU/mL 0.050 (L)   Thyroxine -T4 Latest Ref Range: 4.0 - 12.0 ug/dL 8.7   Free T-4 Latest Ref Range: 0.93 - 1.70 ng/dL 1.45       2.  Vitamin D deficiency  Currently taking vitamin D 50,000 every 7 days.     Ref. Range 2/19/2021 14:44   25-Hydroxy   Vitamin D 25 Latest Ref Range: 30 - 100 ng/mL 40       3.  Prediabetes  No current glycemic reduction therapy.     Ref. Range 12/15/2020  09:09   Glycohemoglobin Latest Ref Range: 0.0 - 5.6 % 5.7 (H)       Patient's medications, allergies, and social histories were reviewed and updated as appropriate.      ROS:     CONS:     No fever, no chills   EYES:     No diplopia, no blurry vision   CV:           No chest pain, no palpitations   PULM:     No SOB, no cough, no hemoptysis.   GI:            No nausea, no vomiting, no diarrhea, no constipation   ENDO:     No polyuria, no polydipsia, no heat intolerance, no cold intolerance       Past Medical History:  Problem List:  2020-12: Prediabetes  2013-11: Low back pain  2013-11: Radicular pain  2013-11: Anxiety  2013-10: Migraine headache with aura  2013-05: Acute asthma exacerbation  2013-05: Obesity, morbid (more than 100 lbs over ideal weight or BMI   > 40) ([facility])  2012-07: Vitamin D deficiency  2009-08: Eczema  2009-08: Asthma  2009-08: Hiatal hernia  2009-08: Menopause  2009-08: Hypothyroid  2009-08: Personal history of gastric bypass  2009-08: Hypercholesterolemia      Past Surgical History:  Past Surgical History:   Procedure Laterality Date   • GASTROSCOPY  8/1/2018    Procedure: GASTROSCOPY- WITH DILATION;  Surgeon: Jamaal Aaron M.D.;  Location: SURGERY [facility];  Service: General   • NISSEN FUNDOPLICATION LAPAROSCOPIC  5/16/2018    Procedure: NISSEN FUNDOPLICATION LAPAROSCOPIC- FOR RECURRENT HIATAL HERNIA;  Surgeon: Jamaal Aaron M.D.;  Location: SURGERY [facility];  Service: General   • OTHER ABDOMINAL SURGERY  2007    revision of gastric pouch   • HERNIA REPAIR  2005    hiatal   • GASTRIC BYPASS LAPAROSCOPIC  2004   • TUBAL COAGULATION LAPAROSCOPIC BILATERAL  1990   • BREAST BIOPSY      microcalcification   • BREAST BIOPSY      left benign breast biopsy in 1994   • CHOLECYSTECTOMY  1993 or 1994    laparoscopic        Allergies:  Nkda [no known drug allergy]     Social History:  Social History     Tobacco Use   • Smoking status: Former Smoker     Packs/day: 0.25     Years: 10.00      "Pack years: 2.50     Types: Cigarettes     Quit date: 1/2/2006     Years since quitting: 15.2   • Smokeless tobacco: Never Used   • Tobacco comment: 5 cigarettes a day   Substance Use Topics   • Alcohol use: No   • Drug use: No        Family History:   family history includes Cancer in her maternal grandfather and maternal grandmother; Heart Disease in her father, maternal grandmother, mother, and paternal grandmother; Hyperlipidemia in her father and mother; Lung Disease in her father.      PHYSICAL EXAM:   Vital signs: /70 (BP Location: Left arm, Patient Position: Sitting, BP Cuff Size: Adult)   Pulse (!) 108   Ht 1.676 m (5' 6\")   Wt 114 kg (250 lb 12.8 oz)   SpO2 96%   BMI 40.48 kg/m²   GENERAL: Well-developed, well-nourished in no apparent distress.   EYE:  No ocular asymmetry, PERRLA  HENT: Pink, moist mucous membranes.    NECK: No thyromegaly.   CARDIOVASCULAR:  No murmurs  LUNGS: Clear breath sounds  ABDOMEN: Soft, nontender   EXTREMITIES: No clubbing, cyanosis, or edema.   NEUROLOGICAL: No gross focal motor abnormalities   LYMPH: No cervical adenopathy palpated.   SKIN: No rashes, lesions.     ASSESSMENT/PLAN:   1. Hypothyroidism due to Hashimoto's thyroiditis  Unstable.  Recommend levothyroxine 200 mcg 7 days/week.  Recommend Cytomel 10 mg 5 days/week.    Repeat lab values before next appointment.      2. Vitamin D deficiency  Unstable.  Continue vitamin D 50,000 units IU weekly.    3.  Prediabetes  Stable.  Will continue to monitor.       Repeat labs 1 to 2 weeks prior to next appointment.  Next appointment in 5 months.    Thank you kindly for allowing me to participate in the thyroid care plan for this patient.    Kathie Dias, APRN  03/19/21    CC:   [facility]  "

## 2021-04-27 ENCOUNTER — HOSPITAL ENCOUNTER (OUTPATIENT)
Dept: RADIOLOGY | Facility: MEDICAL CENTER | Age: 56
End: 2021-04-27
Attending: FAMILY MEDICINE
Payer: COMMERCIAL

## 2021-04-27 DIAGNOSIS — Z12.31 VISIT FOR SCREENING MAMMOGRAM: ICD-10-CM

## 2021-04-27 PROCEDURE — 77063 BREAST TOMOSYNTHESIS BI: CPT

## 2021-08-27 DIAGNOSIS — E06.3 HYPOTHYROIDISM DUE TO HASHIMOTO'S THYROIDITIS: ICD-10-CM

## 2021-08-27 DIAGNOSIS — E03.8 HYPOTHYROIDISM DUE TO HASHIMOTO'S THYROIDITIS: ICD-10-CM

## 2021-08-27 RX ORDER — LIOTHYRONINE SODIUM 5 UG/1
10 TABLET ORAL DAILY
Qty: 30 TABLET | Refills: 11 | Status: SHIPPED | OUTPATIENT
Start: 2021-08-27

## (undated) DEVICE — TUBE E-T HI-LO CUFF 7.0MM (10EA/PK)

## (undated) DEVICE — LACTATED RINGERS INJ 1000 ML - (14EA/CA 60CA/PF)

## (undated) DEVICE — GLOVE, BIOGEL ECLIPSE, SZ 7.0, PF LTX (50/BX)

## (undated) DEVICE — CONTAINER, SPECIMEN, STERILE

## (undated) DEVICE — TROCAR 5X100 NON BLADED Z-TH - READ KII (6/BX)

## (undated) DEVICE — SUTURE GENERAL

## (undated) DEVICE — BALLOON RIGIFLEX SINGLE USE ABD 30MM

## (undated) DEVICE — SYRINGE ALLIANCE INFLATION (5EA/BX)

## (undated) DEVICE — PACK LAP CHOLE OR - (2EA/CA)

## (undated) DEVICE — TROCAR, CAN&SEAL 5X100MM C0509

## (undated) DEVICE — TROCAR Z THREAD12MM OPTICAL - NON BLADED (6/BX)

## (undated) DEVICE — PROTECTOR ULNA NERVE - (36PR/CA)

## (undated) DEVICE — CANISTER SUCTION 3000ML MECHANICAL FILTER AUTO SHUTOFF MEDI-VAC NONSTERILE LF DISP  (40EA/CA)

## (undated) DEVICE — HEAD HOLDER JUNIOR/ADULT

## (undated) DEVICE — MASK ANESTHESIA ADULT  - (100/CA)

## (undated) DEVICE — TUBING CLEARLINK DUO-VENT - C-FLO (48EA/CA)

## (undated) DEVICE — SEALER ARTICULATING TISSUE NSEAL (6/BX)

## (undated) DEVICE — GOWN SURGEONS X-LARGE - DISP. (30/CA)

## (undated) DEVICE — ELECTRODE DUAL RETURN W/ CORD - (50/PK)

## (undated) DEVICE — SET EXTENSION WITH 2 PORTS (48EA/CA) ***PART #2C8610 IS A SUBSTITUTE*****

## (undated) DEVICE — BITEBLOCK ENDOSCOPIC PEDI. - (25/BX)

## (undated) DEVICE — KIT ANESTHESIA W/CIRCUIT & 3/LT BAG W/FILTER (20EA/CA)

## (undated) DEVICE — KIT ROOM DECONTAMINATION

## (undated) DEVICE — BITE BLOCK ADULT 60FR (100EA/CA)

## (undated) DEVICE — SUCTION INSTRUMENT YANKAUER BULBOUS TIP W/O VENT (50EA/CA)

## (undated) DEVICE — SLEEVE, VASO, THIGH, MED

## (undated) DEVICE — GLOVE BIOGEL SZ 7.5 SURGICAL PF LTX - (50PR/BX 4BX/CA)

## (undated) DEVICE — ENDOSTITCH LOAD UNIT 0 SURGI - 12/CA

## (undated) DEVICE — CHLORAPREP 26 ML APPLICATOR - ORANGE TINT(25/CA)

## (undated) DEVICE — SUTURE 4-0 VICRYL PLUSFS-1 - 27 INCH (36/BX)

## (undated) DEVICE — NEPTUNE 4 PORT MANIFOLD - (20/PK)

## (undated) DEVICE — SET SUCTION/IRRIGATION WITH DISPOSABLE TIP (6/CA )PART #0250-070-520 IS A SUB

## (undated) DEVICE — ELECTRODE 850 FOAM ADHESIVE - HYDROGEL RADIOTRNSPRNT (50/PK)

## (undated) DEVICE — WATER IRRIG. STER. 1500 ML - (9/CA)

## (undated) DEVICE — ENDOSTITCH  POLY 0 48 VIO DLU - (12EA/CA)

## (undated) DEVICE — SENSOR SPO2 NEO LNCS ADHESIVE (20/BX) SEE USER NOTES

## (undated) DEVICE — GLOVE BIOGEL PI INDICATOR SZ 7.0 SURGICAL PF LF - (50/BX 4BX/CA)

## (undated) DEVICE — ENDOSTITCH10MM SUTURING DEVIC - (3/CA)

## (undated) DEVICE — KIT CUSTOM PROCEDURE SINGLE FOR ENDO  (15/CA)

## (undated) DEVICE — SODIUM CHL IRRIGATION 0.9% 1000ML (12EA/CA)

## (undated) DEVICE — SOD. CHL 10CC SYRINGE PREFILL - W/10 CC (30/BX)

## (undated) DEVICE — GOWN WARMING STANDARD FLEX - (30/CA)

## (undated) DEVICE — Device

## (undated) DEVICE — SET LEADWIRE 5 LEAD BEDSIDE DISPOSABLE ECG (1SET OF 5/EA)